# Patient Record
Sex: FEMALE | Race: WHITE | Employment: FULL TIME | URBAN - METROPOLITAN AREA
[De-identification: names, ages, dates, MRNs, and addresses within clinical notes are randomized per-mention and may not be internally consistent; named-entity substitution may affect disease eponyms.]

---

## 2018-04-06 ENCOUNTER — HOSPITAL ENCOUNTER (OUTPATIENT)
Dept: ULTRASOUND IMAGING | Age: 38
Discharge: HOME | End: 2018-04-06
Payer: COMMERCIAL

## 2018-04-06 DIAGNOSIS — Z34.91 CURRENTLY PREGNANT IN FIRST TRIMESTER WITH UNKNOWN GESTATIONAL AGE: ICD-10-CM

## 2018-04-06 PROCEDURE — 76817 TRANSVAGINAL US OBSTETRIC: CPT

## 2018-11-13 ENCOUNTER — HOSPITAL ENCOUNTER (EMERGENCY)
Age: 38
Discharge: HOME | End: 2018-11-13
Attending: EMERGENCY MEDICINE
Payer: COMMERCIAL

## 2018-11-13 VITALS
BODY MASS INDEX: 24.55 KG/M2 | SYSTOLIC BLOOD PRESSURE: 110 MMHG | HEART RATE: 100 BPM | TEMPERATURE: 98 F | WEIGHT: 130 LBS | HEIGHT: 61 IN | OXYGEN SATURATION: 98 % | RESPIRATION RATE: 14 BRPM | DIASTOLIC BLOOD PRESSURE: 74 MMHG

## 2018-11-13 DIAGNOSIS — J01.01 ACUTE RECURRENT MAXILLARY SINUSITIS: Primary | ICD-10-CM

## 2018-11-13 PROCEDURE — G0382 LEV 3 HOSP TYPE B ED VISIT: HCPCS

## 2018-11-13 PROCEDURE — 99283 EMERGENCY DEPT VISIT LOW MDM: CPT

## 2018-11-13 RX ORDER — AZITHROMYCIN 250 MG/1
TABLET, FILM COATED ORAL
Qty: 1 PACKET | Refills: 0 | Status: SHIPPED | OUTPATIENT
Start: 2018-11-13 | End: 2018-11-23

## 2018-11-13 NOTE — ED PROVIDER NOTES
HPI:  18, Time: 5:46 PM         Dayna Alaniz is a 41 y.o. female presenting to the ED for cough, beginning days ago. The complaint has been intermittent, mild in severity, and worsened by nothing. No chest pain no shortness of breath no fever no chills. She was recently placed on amoxicillin for sinusitis. She said a nonproductive cough as well as nasal congestion. No wheezing    ROS:   Pertinent positives and negatives are stated within HPI, all other systems reviewed and are negative.  --------------------------------------------- PAST HISTORY ---------------------------------------------  Past Medical History:  has a past medical history of Migraine. Past Surgical History:  has a past surgical history that includes  section. Social History:  reports that she has been smoking. She has been smoking about 0.50 packs per day. She does not have any smokeless tobacco history on file. She reports that she does not drink alcohol or use drugs. Family History: family history is not on file. The patients home medications have been reviewed. Allergies: Imitrex [sumatriptan]    ---------------------------------------------------PHYSICAL EXAM--------------------------------------     Constitutional/General: Alert and oriented x3, well appearing, non toxic in NAD  Head: Normocephalic and atraumatic  Eyes: PERRL, EOMI  Ears reveal bilateral serous effusion. No erythema tympanic membranes  Nares reveal nasal congestion  Mouth: Oropharynx clear, handling secretions, no trismus. No erythema no exudate  Neck: Supple, full ROM, non tender to palpation in the midline, no stridor, no crepitus, no meningeal signs  Pulmonary: Lungs clear to auscultation bilaterally, no wheezes, rales, or rhonchi. Not in respiratory distress  Cardiovascular:  Regular rate. Regular rhythm. No murmurs, gallops, or rubs. 2+ distal pulses  Chest: no chest wall tenderness  Abdomen: Soft. Non tender. Non distended. +BS.   No

## 2018-12-11 ENCOUNTER — HOSPITAL ENCOUNTER (EMERGENCY)
Age: 38
Discharge: HOME | End: 2018-12-11
Attending: EMERGENCY MEDICINE
Payer: COMMERCIAL

## 2018-12-11 VITALS
BODY MASS INDEX: 23.98 KG/M2 | HEART RATE: 82 BPM | HEIGHT: 61 IN | TEMPERATURE: 98.7 F | WEIGHT: 127 LBS | DIASTOLIC BLOOD PRESSURE: 68 MMHG | OXYGEN SATURATION: 98 % | RESPIRATION RATE: 16 BRPM | SYSTOLIC BLOOD PRESSURE: 100 MMHG

## 2018-12-11 DIAGNOSIS — J01.90 ACUTE SINUSITIS, RECURRENCE NOT SPECIFIED, UNSPECIFIED LOCATION: Primary | ICD-10-CM

## 2018-12-11 DIAGNOSIS — Z34.90 PREGNANCY, UNSPECIFIED GESTATIONAL AGE: ICD-10-CM

## 2018-12-11 LAB
BACTERIA: NORMAL /HPF
BILIRUBIN URINE: ABNORMAL
BLOOD, URINE: ABNORMAL
CLARITY: CLEAR
COLOR: YELLOW
GLUCOSE URINE: NEGATIVE MG/DL
HCG(URINE) PREGNANCY TEST: POSITIVE
KETONES, URINE: ABNORMAL MG/DL
LEUKOCYTE ESTERASE, URINE: NEGATIVE
NITRITE, URINE: NEGATIVE
PH UA: 6.5 (ref 5–9)
PROTEIN UA: NEGATIVE MG/DL
RBC UA: NORMAL /HPF (ref 0–2)
SPECIFIC GRAVITY UA: 1.02 (ref 1–1.03)
UROBILINOGEN, URINE: 1 E.U./DL
WBC UA: NORMAL /HPF (ref 0–5)

## 2018-12-11 PROCEDURE — 81025 URINE PREGNANCY TEST: CPT

## 2018-12-11 PROCEDURE — G0382 LEV 3 HOSP TYPE B ED VISIT: HCPCS

## 2018-12-11 PROCEDURE — 81001 URINALYSIS AUTO W/SCOPE: CPT

## 2018-12-11 PROCEDURE — 99283 EMERGENCY DEPT VISIT LOW MDM: CPT

## 2018-12-11 RX ORDER — AMOXICILLIN AND CLAVULANATE POTASSIUM 875; 125 MG/1; MG/1
1 TABLET, FILM COATED ORAL 2 TIMES DAILY
Qty: 20 TABLET | Refills: 0 | Status: SHIPPED | OUTPATIENT
Start: 2018-12-11 | End: 2018-12-21

## 2018-12-11 RX ORDER — LORATADINE 10 MG/1
10 TABLET ORAL DAILY
Qty: 14 TABLET | Refills: 0 | Status: SHIPPED | OUTPATIENT
Start: 2018-12-11 | End: 2021-05-19

## 2018-12-11 ASSESSMENT — ENCOUNTER SYMPTOMS
SHORTNESS OF BREATH: 0
CONSTIPATION: 0
COLOR CHANGE: 0
VOICE CHANGE: 0
TROUBLE SWALLOWING: 0
BLOOD IN STOOL: 0
RHINORRHEA: 1
DIARRHEA: 0
VOMITING: 0
NAUSEA: 0
SORE THROAT: 0
ABDOMINAL PAIN: 0
COUGH: 0
SINUS PRESSURE: 1
BACK PAIN: 0
SINUS PAIN: 1

## 2018-12-11 ASSESSMENT — PAIN DESCRIPTION - LOCATION: LOCATION: FACE;HEAD

## 2018-12-11 ASSESSMENT — PAIN DESCRIPTION - ONSET: ONSET: ON-GOING

## 2018-12-11 ASSESSMENT — PAIN DESCRIPTION - PROGRESSION: CLINICAL_PROGRESSION: NOT CHANGED

## 2018-12-11 ASSESSMENT — PAIN DESCRIPTION - PAIN TYPE: TYPE: ACUTE PAIN

## 2018-12-11 ASSESSMENT — PAIN DESCRIPTION - DESCRIPTORS: DESCRIPTORS: ACHING

## 2018-12-11 ASSESSMENT — PAIN SCALES - GENERAL: PAINLEVEL_OUTOF10: 7

## 2018-12-11 ASSESSMENT — PAIN DESCRIPTION - FREQUENCY: FREQUENCY: CONTINUOUS

## 2018-12-11 NOTE — ED PROVIDER NOTES
Patient is a 30-year-old female presenting with sinusitis she is also curious if she is pregnant or not. She has had URI symptoms for about a month now. She was seen here about 3 weeks ago and given a Z-Keith for them but that has not helped. Her symptoms have persisted. She's not tried anything else. She has nasal congestion, rhinorrhea, sneezing, and facial pressure. She denies any fevers or chills. She feels that she has postnasal drip that causes her to have a little cough. Nothing makes her symptoms worse. She denies any shortness of breath. She denies any seasonal allergies. She really she may be pregnant. She did a pregnancy to \[facility] [place] [facility]\"" thanks giving and it was positive. Her last menstrual period was in October. She says that she has had a few miscarriages in the past. She denies any abdominal pain. She has had intermittent scattered vaginal spotting at times. She denies any urinary symptoms. She denies any back pain. The history is provided by the patient. Review of Systems   Constitutional: Negative for chills and fever. HENT: Positive for congestion, postnasal drip, rhinorrhea, sinus pain and sinus pressure. Negative for ear pain, sore throat, trouble swallowing and voice change. Respiratory: Negative for cough and shortness of breath. Cardiovascular: Negative for chest pain and palpitations. Gastrointestinal: Negative for abdominal pain, blood in stool, constipation, diarrhea, nausea and vomiting. Genitourinary: Negative for difficulty urinating, dysuria and hematuria. Musculoskeletal: Negative for back pain and neck pain. Skin: Negative for color change, rash and wound. Neurological: Negative for dizziness, syncope, weakness, light-headedness and headaches. Psychiatric/Behavioral: Negative for confusion. Physical Exam   Constitutional: She is oriented to person, place, and time. She appears well-developed and well-nourished. No distress.    Looks older than stated age   HENT:   Head: Normocephalic and atraumatic. Right Ear: External ear normal.   Left Ear: External ear normal.   Nose: Nose normal.   Mouth/Throat: Oropharynx is clear and moist. No oropharyngeal exudate. Frontal and maxillary sinus tenderness to palpation; his mucosa erythematous   Eyes: Pupils are equal, round, and reactive to light. Conjunctivae and EOM are normal. Right eye exhibits no discharge. Left eye exhibits no discharge. No scleral icterus. Neck: Neck supple. Cardiovascular: Normal rate, regular rhythm, normal heart sounds and intact distal pulses. Exam reveals no gallop and no friction rub. No murmur heard. Pulmonary/Chest: Effort normal and breath sounds normal. No stridor. No respiratory distress. She has no wheezes. She has no rales. She exhibits no tenderness. Abdominal: Soft. Bowel sounds are normal. She exhibits no distension and no mass. There is no tenderness. There is no rebound and no guarding. Musculoskeletal: She exhibits no edema. Neurological: She is alert and oriented to person, place, and time. She exhibits normal muscle tone. Skin: Skin is warm and dry. No rash noted. She is not diaphoretic. No erythema. No pallor. Psychiatric: She has a normal mood and affect. Her behavior is normal. Judgment and thought content normal.       Procedures    MDM  Number of Diagnoses or Management Options  Acute sinusitis, recurrence not specified, unspecified location:   Pregnancy, unspecified gestational age:   Diagnosis management comments: Patient has had URI symptoms for the month. She was treated with Z-Keith but did not get better. Patient will be given Augmentin and Claritin. Her urine prior to CTA chest was positive. Her urinalysis was negative for infection. She is not having any abdominal pain and bleeding today. She was encouraged to take daily prenatal vitamins and avoid taking any ibuprofen, Motrin, or aspirin and only to take Tylenol as needed for pain.  She was given

## 2018-12-21 ENCOUNTER — HOSPITAL ENCOUNTER (OUTPATIENT)
Age: 38
Discharge: HOME | End: 2018-12-21
Payer: COMMERCIAL

## 2018-12-21 LAB
ABO/RH: NORMAL
ANTIBODY SCREEN: NORMAL
BASOPHILS ABSOLUTE: 0.06 E9/L (ref 0–0.2)
BASOPHILS RELATIVE PERCENT: 0.9 % (ref 0–2)
EOSINOPHILS ABSOLUTE: 0.12 E9/L (ref 0.05–0.5)
EOSINOPHILS RELATIVE PERCENT: 1.8 % (ref 0–6)
GONADOTROPIN, CHORIONIC (HCG) QUANT: 33.1 MIU/ML
HCT VFR BLD CALC: 40.5 % (ref 34–48)
HEMOGLOBIN: 13.7 G/DL (ref 11.5–15.5)
IMMATURE GRANULOCYTES #: 0.02 E9/L
IMMATURE GRANULOCYTES %: 0.3 % (ref 0–5)
LYMPHOCYTES ABSOLUTE: 1.99 E9/L (ref 1.5–4)
LYMPHOCYTES RELATIVE PERCENT: 30.1 % (ref 20–42)
MCH RBC QN AUTO: 31.6 PG (ref 26–35)
MCHC RBC AUTO-ENTMCNC: 33.8 % (ref 32–34.5)
MCV RBC AUTO: 93.5 FL (ref 80–99.9)
MONOCYTES ABSOLUTE: 0.29 E9/L (ref 0.1–0.95)
MONOCYTES RELATIVE PERCENT: 4.4 % (ref 2–12)
NEUTROPHILS ABSOLUTE: 4.14 E9/L (ref 1.8–7.3)
NEUTROPHILS RELATIVE PERCENT: 62.5 % (ref 43–80)
PDW BLD-RTO: 13.3 FL (ref 11.5–15)
PLATELET # BLD: 341 E9/L (ref 130–450)
PMV BLD AUTO: 8.9 FL (ref 7–12)
RBC # BLD: 4.33 E12/L (ref 3.5–5.5)
WBC # BLD: 6.6 E9/L (ref 4.5–11.5)

## 2018-12-21 PROCEDURE — 86703 HIV-1/HIV-2 1 RESULT ANTBDY: CPT

## 2018-12-21 PROCEDURE — 87340 HEPATITIS B SURFACE AG IA: CPT

## 2018-12-21 PROCEDURE — 84702 CHORIONIC GONADOTROPIN TEST: CPT

## 2018-12-21 PROCEDURE — 36415 COLL VENOUS BLD VENIPUNCTURE: CPT

## 2018-12-21 PROCEDURE — 86900 BLOOD TYPING SEROLOGIC ABO: CPT

## 2018-12-21 PROCEDURE — 85025 COMPLETE CBC W/AUTO DIFF WBC: CPT

## 2018-12-21 PROCEDURE — 86850 RBC ANTIBODY SCREEN: CPT

## 2018-12-21 PROCEDURE — 86592 SYPHILIS TEST NON-TREP QUAL: CPT

## 2018-12-21 PROCEDURE — 86901 BLOOD TYPING SEROLOGIC RH(D): CPT

## 2018-12-21 PROCEDURE — 86762 RUBELLA ANTIBODY: CPT

## 2018-12-21 PROCEDURE — 86803 HEPATITIS C AB TEST: CPT

## 2018-12-26 LAB
HEPATITIS B SURFACE ANTIGEN INTERPRETATION: NORMAL
HEPATITIS C ANTIBODY INTERPRETATION: REACTIVE
HIV-1 AND HIV-2 ANTIBODIES: NORMAL
RPR: NORMAL

## 2018-12-27 LAB — RUBELLA ANTIBODY IGG: NORMAL

## 2020-02-11 ENCOUNTER — HOSPITAL ENCOUNTER (OUTPATIENT)
Age: 40
Discharge: HOME | End: 2020-02-11
Payer: COMMERCIAL

## 2020-02-11 LAB
ALBUMIN SERPL-MCNC: 4.7 G/DL (ref 3.5–5.2)
ALP BLD-CCNC: 66 U/L (ref 35–104)
ALT SERPL-CCNC: 46 U/L (ref 0–32)
ANION GAP SERPL CALCULATED.3IONS-SCNC: 12 MMOL/L (ref 7–16)
AST SERPL-CCNC: 31 U/L (ref 0–31)
BASOPHILS ABSOLUTE: 0.07 E9/L (ref 0–0.2)
BASOPHILS RELATIVE PERCENT: 1.1 % (ref 0–2)
BILIRUB SERPL-MCNC: 0.5 MG/DL (ref 0–1.2)
BUN BLDV-MCNC: 13 MG/DL (ref 6–20)
CALCIUM SERPL-MCNC: 9.8 MG/DL (ref 8.6–10.2)
CHLORIDE BLD-SCNC: 101 MMOL/L (ref 98–107)
CO2: 25 MMOL/L (ref 22–29)
CREAT SERPL-MCNC: 0.5 MG/DL (ref 0.5–1)
EOSINOPHILS ABSOLUTE: 0.15 E9/L (ref 0.05–0.5)
EOSINOPHILS RELATIVE PERCENT: 2.3 % (ref 0–6)
GFR AFRICAN AMERICAN: >60
GFR NON-AFRICAN AMERICAN: >60 ML/MIN/1.73
GLUCOSE BLD-MCNC: 74 MG/DL (ref 74–99)
HCT VFR BLD CALC: 40.3 % (ref 34–48)
HEMOGLOBIN: 13.7 G/DL (ref 11.5–15.5)
IMMATURE GRANULOCYTES #: 0.01 E9/L
IMMATURE GRANULOCYTES %: 0.2 % (ref 0–5)
LYMPHOCYTES ABSOLUTE: 2.63 E9/L (ref 1.5–4)
LYMPHOCYTES RELATIVE PERCENT: 41 % (ref 20–42)
MCH RBC QN AUTO: 32 PG (ref 26–35)
MCHC RBC AUTO-ENTMCNC: 34 % (ref 32–34.5)
MCV RBC AUTO: 94.2 FL (ref 80–99.9)
MONOCYTES ABSOLUTE: 0.43 E9/L (ref 0.1–0.95)
MONOCYTES RELATIVE PERCENT: 6.7 % (ref 2–12)
NEUTROPHILS ABSOLUTE: 3.13 E9/L (ref 1.8–7.3)
NEUTROPHILS RELATIVE PERCENT: 48.7 % (ref 43–80)
PDW BLD-RTO: 12.4 FL (ref 11.5–15)
PLATELET # BLD: 237 E9/L (ref 130–450)
PMV BLD AUTO: 9.4 FL (ref 7–12)
POTASSIUM SERPL-SCNC: 4.6 MMOL/L (ref 3.5–5)
RBC # BLD: 4.28 E12/L (ref 3.5–5.5)
SODIUM BLD-SCNC: 138 MMOL/L (ref 132–146)
TOTAL PROTEIN: 8.7 G/DL (ref 6.4–8.3)
TSH SERPL DL<=0.05 MIU/L-ACNC: 2.04 UIU/ML (ref 0.27–4.2)
WBC # BLD: 6.4 E9/L (ref 4.5–11.5)

## 2020-02-11 PROCEDURE — 84443 ASSAY THYROID STIM HORMONE: CPT

## 2020-02-11 PROCEDURE — 85025 COMPLETE CBC W/AUTO DIFF WBC: CPT

## 2020-02-11 PROCEDURE — 36415 COLL VENOUS BLD VENIPUNCTURE: CPT

## 2020-02-11 PROCEDURE — 80053 COMPREHEN METABOLIC PANEL: CPT

## 2020-06-26 ENCOUNTER — OFFICE VISIT (OUTPATIENT)
Dept: NEUROLOGY | Age: 40
End: 2020-06-26
Payer: COMMERCIAL

## 2020-06-26 VITALS
DIASTOLIC BLOOD PRESSURE: 70 MMHG | HEIGHT: 61 IN | BODY MASS INDEX: 21.14 KG/M2 | WEIGHT: 112 LBS | SYSTOLIC BLOOD PRESSURE: 100 MMHG | TEMPERATURE: 98.3 F

## 2020-06-26 PROBLEM — G43.109 MIGRAINE WITH AURA AND WITHOUT STATUS MIGRAINOSUS, NOT INTRACTABLE: Chronic | Status: ACTIVE | Noted: 2020-06-26

## 2020-06-26 PROBLEM — Z86.69 HISTORY OF MIGRAINE: Status: ACTIVE | Noted: 2020-06-26

## 2020-06-26 PROBLEM — F17.200 TOBACCO USE DISORDER: Chronic | Status: ACTIVE | Noted: 2020-06-26

## 2020-06-26 PROBLEM — R90.89 ABNORMAL BRAIN MRI: Status: ACTIVE | Noted: 2020-06-26

## 2020-06-26 PROCEDURE — G8427 DOCREV CUR MEDS BY ELIG CLIN: HCPCS | Performed by: PSYCHIATRY & NEUROLOGY

## 2020-06-26 PROCEDURE — 1036F TOBACCO NON-USER: CPT | Performed by: PSYCHIATRY & NEUROLOGY

## 2020-06-26 PROCEDURE — G8420 CALC BMI NORM PARAMETERS: HCPCS | Performed by: PSYCHIATRY & NEUROLOGY

## 2020-06-26 PROCEDURE — 99204 OFFICE O/P NEW MOD 45 MIN: CPT | Performed by: PSYCHIATRY & NEUROLOGY

## 2020-06-26 RX ORDER — AMITRIPTYLINE HYDROCHLORIDE 10 MG/1
TABLET, FILM COATED ORAL
Qty: 60 TABLET | Refills: 2 | Status: SHIPPED
Start: 2020-06-26 | End: 2021-05-19

## 2020-06-26 RX ORDER — FLUTICASONE PROPIONATE 50 MCG
SPRAY, SUSPENSION (ML) NASAL
COMMUNITY
Start: 2020-06-25 | End: 2021-05-19

## 2020-06-26 ASSESSMENT — ENCOUNTER SYMPTOMS
ALLERGIC/IMMUNOLOGIC NEGATIVE: 1
GASTROINTESTINAL NEGATIVE: 1
EYES NEGATIVE: 1
RESPIRATORY NEGATIVE: 1

## 2020-06-26 NOTE — PROGRESS NOTES
Standing Status:   Future     Standing Expiration Date:   6/26/2021    RPR Reflex to Titer and TPPA     Standing Status:   Future     Standing Expiration Date:   6/26/2021    Ammonia     Standing Status:   Future     Standing Expiration Date:   6/26/2021    Lipid Panel     Standing Status:   Future     Standing Expiration Date:   6/26/2021     Order Specific Question:   Is Patient Fasting?/# of Hours     Answer:   yes     Comments:   10

## 2020-07-07 ENCOUNTER — TELEPHONE (OUTPATIENT)
Dept: NEUROLOGY | Age: 40
End: 2020-07-07

## 2020-07-07 NOTE — TELEPHONE ENCOUNTER
[facility], prior authorization is required for Brain CTA at [facility]. Aleksandar Lizama approved 7/1/20-12/28/20, auth # [id number].

## 2021-01-25 ENCOUNTER — HOSPITAL ENCOUNTER (OUTPATIENT)
Age: 41
Discharge: HOME | End: 2021-01-27
Payer: COMMERCIAL

## 2021-01-25 ENCOUNTER — HOSPITAL ENCOUNTER (OUTPATIENT)
Dept: GENERAL RADIOLOGY | Age: 41
Discharge: HOME | End: 2021-01-27
Payer: COMMERCIAL

## 2021-01-25 DIAGNOSIS — M54.50 LUMBAR SPINE PAIN: ICD-10-CM

## 2021-01-25 DIAGNOSIS — M54.6 THORACIC SPINE PAIN: ICD-10-CM

## 2021-01-25 PROCEDURE — 72072 X-RAY EXAM THORAC SPINE 3VWS: CPT

## 2021-01-25 PROCEDURE — 72220 X-RAY EXAM SACRUM TAILBONE: CPT

## 2021-01-25 PROCEDURE — 72100 X-RAY EXAM L-S SPINE 2/3 VWS: CPT

## 2021-05-13 ENCOUNTER — HOSPITAL ENCOUNTER (OUTPATIENT)
Age: 41
Discharge: HOME | End: 2021-05-13
Payer: COMMERCIAL

## 2021-05-13 LAB
ALBUMIN SERPL-MCNC: 4.6 G/DL (ref 3.5–5.2)
ALP BLD-CCNC: 66 U/L (ref 35–104)
ALT SERPL-CCNC: 42 U/L (ref 0–32)
ANION GAP SERPL CALCULATED.3IONS-SCNC: 9 MMOL/L (ref 7–16)
AST SERPL-CCNC: 34 U/L (ref 0–31)
BASOPHILS ABSOLUTE: 0.04 E9/L (ref 0–0.2)
BASOPHILS RELATIVE PERCENT: 0.6 % (ref 0–2)
BILIRUB SERPL-MCNC: 0.7 MG/DL (ref 0–1.2)
BUN BLDV-MCNC: 8 MG/DL (ref 6–20)
CALCIUM SERPL-MCNC: 9.5 MG/DL (ref 8.6–10.2)
CHLORIDE BLD-SCNC: 101 MMOL/L (ref 98–107)
CHOLESTEROL, TOTAL: 107 MG/DL (ref 0–199)
CO2: 27 MMOL/L (ref 22–29)
CREAT SERPL-MCNC: 0.6 MG/DL (ref 0.5–1)
EOSINOPHILS ABSOLUTE: 0.08 E9/L (ref 0.05–0.5)
EOSINOPHILS RELATIVE PERCENT: 1.3 % (ref 0–6)
GFR AFRICAN AMERICAN: >60
GFR NON-AFRICAN AMERICAN: >60 ML/MIN/1.73
GLUCOSE BLD-MCNC: 79 MG/DL (ref 74–99)
HBA1C MFR BLD: 4.9 % (ref 4–5.6)
HCT VFR BLD CALC: 40.4 % (ref 34–48)
HDLC SERPL-MCNC: 37 MG/DL
HEMOGLOBIN: 14.2 G/DL (ref 11.5–15.5)
IMMATURE GRANULOCYTES #: 0.02 E9/L
IMMATURE GRANULOCYTES %: 0.3 % (ref 0–5)
LDL CHOLESTEROL CALCULATED: 57 MG/DL (ref 0–99)
LYMPHOCYTES ABSOLUTE: 1.8 E9/L (ref 1.5–4)
LYMPHOCYTES RELATIVE PERCENT: 28.7 % (ref 20–42)
MCH RBC QN AUTO: 32.3 PG (ref 26–35)
MCHC RBC AUTO-ENTMCNC: 35.1 % (ref 32–34.5)
MCV RBC AUTO: 91.8 FL (ref 80–99.9)
MONOCYTES ABSOLUTE: 0.37 E9/L (ref 0.1–0.95)
MONOCYTES RELATIVE PERCENT: 5.9 % (ref 2–12)
NEUTROPHILS ABSOLUTE: 3.97 E9/L (ref 1.8–7.3)
NEUTROPHILS RELATIVE PERCENT: 63.2 % (ref 43–80)
PDW BLD-RTO: 12.2 FL (ref 11.5–15)
PLATELET # BLD: 247 E9/L (ref 130–450)
PMV BLD AUTO: 9.2 FL (ref 7–12)
POTASSIUM SERPL-SCNC: 4.1 MMOL/L (ref 3.5–5)
RBC # BLD: 4.4 E12/L (ref 3.5–5.5)
SODIUM BLD-SCNC: 137 MMOL/L (ref 132–146)
T4 FREE: 1.26 NG/DL (ref 0.93–1.7)
TOTAL PROTEIN: 8.1 G/DL (ref 6.4–8.3)
TRIGL SERPL-MCNC: 64 MG/DL (ref 0–149)
TSH SERPL DL<=0.05 MIU/L-ACNC: 2.26 UIU/ML (ref 0.27–4.2)
VITAMIN D 25-HYDROXY: 13 NG/ML (ref 30–100)
VLDLC SERPL CALC-MCNC: 13 MG/DL
WBC # BLD: 6.3 E9/L (ref 4.5–11.5)

## 2021-05-13 PROCEDURE — 82306 VITAMIN D 25 HYDROXY: CPT

## 2021-05-13 PROCEDURE — 80061 LIPID PANEL: CPT

## 2021-05-13 PROCEDURE — 87389 HIV-1 AG W/HIV-1&-2 AB AG IA: CPT

## 2021-05-13 PROCEDURE — 85025 COMPLETE CBC W/AUTO DIFF WBC: CPT

## 2021-05-13 PROCEDURE — 83036 HEMOGLOBIN GLYCOSYLATED A1C: CPT

## 2021-05-13 PROCEDURE — 80053 COMPREHEN METABOLIC PANEL: CPT

## 2021-05-13 PROCEDURE — 84443 ASSAY THYROID STIM HORMONE: CPT

## 2021-05-13 PROCEDURE — 36415 COLL VENOUS BLD VENIPUNCTURE: CPT

## 2021-05-13 PROCEDURE — 84439 ASSAY OF FREE THYROXINE: CPT

## 2021-05-16 LAB
Lab: NORMAL
REPORT: NORMAL
THIS TEST SENT TO: NORMAL

## 2021-05-19 ENCOUNTER — HOSPITAL ENCOUNTER (OUTPATIENT)
Age: 41
Discharge: HOME | End: 2021-05-21
Payer: COMMERCIAL

## 2021-05-19 ENCOUNTER — OFFICE VISIT (OUTPATIENT)
Dept: SURGERY | Age: 41
End: 2021-05-19
Payer: COMMERCIAL

## 2021-05-19 VITALS
WEIGHT: 112 LBS | HEART RATE: 65 BPM | DIASTOLIC BLOOD PRESSURE: 48 MMHG | SYSTOLIC BLOOD PRESSURE: 85 MMHG | BODY MASS INDEX: 21.14 KG/M2 | TEMPERATURE: 98.3 F | HEIGHT: 61 IN

## 2021-05-19 DIAGNOSIS — D21.0: Primary | ICD-10-CM

## 2021-05-19 DIAGNOSIS — L72.3 SEBACEOUS CYST: Primary | ICD-10-CM

## 2021-05-19 PROCEDURE — 21555 EXC NECK LES SC < 3 CM: CPT | Performed by: SURGERY

## 2021-05-19 PROCEDURE — 99203 OFFICE O/P NEW LOW 30 MIN: CPT | Performed by: SURGERY

## 2021-05-19 NOTE — PROGRESS NOTES
General Surgery History and Physical  [facility]    Patient's Name/Date of Birth: Vicky Vázquez /     Date: May 19, 2021     Surgeon: Nida Garza MD    PCP: RAYNA Raman CNP     Chief Complaint: soft tissue neoplasm of the left neck    HPI:   Vicky Vázquez is a 46 y.o. female who presents for evaluation of soft tissue neoplasm of the left neck with pain and swelling. Timing is constant, radiation to left neck, alleviated by nothing and started several weeks ago and severity is 8/10. Has drainage, some pain. Denies similar in the past. No fever, chills. Denies previous at the same site. Would like to have removed. Has been treated with antibiotics but the swelling has returned. She has not had it drained previously. Patient Active Problem List   Diagnosis    Abnormal brain MRI    History of migraine    Migraine with aura and without status migrainosus, not intractable    Tobacco use disorder       Past Medical History:   Diagnosis Date    Abnormal brain MRI 2020    History of migraine 2020    Migraine     Migraine with aura and without status migrainosus, not intractable 2020    [facility] x 2 mos. , hx migraines    Tobacco use disorder 2020       Past Surgical History:   Procedure Laterality Date     SECTION      x3       Allergies   Allergen Reactions    Imitrex [Sumatriptan] Shortness Of Breath     States had to be intubated with this medication    Naltrexone Other (See Comments)     \"Closes throat\"       The patient has a family history that is negative for severe cardiovascular or respiratory issues, negative for reaction to anesthesia. Time spent reviewing past medical, surgical, social and family history, vitals, nursing assessment and images. No changes from above documented history.     Social History     Socioeconomic History    Marital status:      Spouse name: Not on file    Number of children: Not on file  Years of education: Not on file    Highest education level: Not on file   Occupational History    Not on file   Tobacco Use    Smoking status: Former Smoker     Packs/day: 0.50    Smokeless tobacco: Never Used   Vaping Use    Vaping Use: Every day    Substances: Always   Substance and Sexual Activity    Alcohol use: No    Drug use: No     Comment: heroin in past    Sexual activity: Yes   Other Topics Concern    Not on file   Social History Narrative    Not on file     Social Determinants of Health     Financial Resource Strain:     Difficulty of Paying Living Expenses:    Food Insecurity:     Worried About Running Out of Food in the Last Year:     [address] in the Last Year:    Transportation Needs:     Lack of Transportation (Medical):  Lack of Transportation (Non-Medical):    Physical Activity:     Days of Exercise per Week:     Minutes of Exercise per Session:    Stress:     Feeling of Stress :    Social Connections:     Frequency of Communication with Friends and Family:     Frequency of Social Gatherings with Friends and Family:     Attends [employer] Services:     Active Member of Clubs or Organizations:     Attends Club or Organization Meetings:     Marital Status:    Intimate Partner Violence:     Fear of Current or Ex-Partner:     Emotionally Abused:     Physically Abused:     Sexually Abused:            Review of Systems  A complete 10 system review was performed and are otherwise negative unless mentioned in the above HPI. Specific negatives are listed below but may not include all those reviewed.     General ROS: negative obtundation, AMS  ENT ROS: negative rhinorrhea, epistaxis  Allergy and Immunology ROS: negative itchy/watery eyes or nasal congestion  Hematological and Lymphatic ROS: negative spontaneous bleeding or bruising  Endocrine ROS: negative  lethargy, mood swings, palpitations or polydipsia/polyuria  Respiratory ROS: negative sputum changes, stridor, tachypnea or wheezing  Cardiovascular ROS: negative for - loss of consciousness, murmur or orthopnea  Gastrointestinal ROS: negative for - hematochezia or hematemesis  Genito-Urinary ROS: negative for -  genital discharge or hematuria  Musculoskeletal ROS: negative for - focal weakness, gangrene  Psych/Neuro ROS: negative for - visual or auditory hallucinations, suicidal ideation    Physical exam:   BP (!) 85/48   Pulse 65   Temp 98.3 °F (36.8 °C) (Temporal)   Ht 5' 1\" (1.549 m)   Wt 112 lb (50.8 kg)   BMI 21.16 kg/m²   General appearance:  NAD, appears stated age  Head: NCAT, PERRLA, EOMI, red conjunctiva  Neck: supple, no masses, trachea midline  Lungs: Equal chest rise bilateral, no retractions, no wheezing  Heart: Reg rate  Abdomen: soft, nontender, nondistended  Skin; soft tissue mass 2 cm cm located left lateral neck, mobile, tender, no drainage  Gu: no cva tenderness  Extremities: atraumatic, no focal motor deficits, no open wounds  Psych: No tremor, visual hallucinations      Radiology: N/A    Assessment:  Addie Domínguez is a 46 y.o. female with soft tissue neoplasm of the left neck, pain at the site with swelling  Patient Active Problem List   Diagnosis    Abnormal brain MRI    History of migraine    Migraine with aura and without status migrainosus, not intractable    Tobacco use disorder         Plan:  Excision soft tissue neoplasm of left neck  Discussed the risk, benefits and alternatives of surgery including wound infections, bleeding, scar, seroma, hematoma and recurrence of the mass or similar in other locations and the risks of general anesthetic including MI, CVA, sudden death or reactions to anesthetic medications. The patient understands the risks and alternatives and the possibility of converting to an open procedure. All questions were answered to the patient's satisfaction and they freely signed the consent.        DATE OF PROCEDURE: 5/19/2021    Operative Note  [facility]     Reid Bui Click  <[id number]>    SURGEON: Angie Mckenzie MD    ASSISTANT: none    PREOPERATIVE DIAGNOSIS: Soft tissue neoplasm of left neck with recurrent infections  and pain. POSTOPERATIVE DIAGNOSIS: same. OPERATION: Excision of soft tissue neoplasm of left neck. ANESTHESIA: LMAC and local.     ESTIMATED BLOOD LOSS: Minimal.     COMPLICATIONS: None. FLUIDS: Crystalloid. DISPOSITION: home. SPECIMEN: Soft tissue neoplasm left neck. PROCEDURE: Within the exam room, the patient was laid in the semirecumbent position with her head turned to the right. The lesion in left neck were prepped and draped in a sterile fashion. 1% lidocaine with epinephrine was then injected around the lesion. Once this was done, approximately a 3 cm elliptical incision was made around the lesion with a 15 blade scalpel. Dissection was carried down through the subcutaneous tissues down to the base of the lesion which was removed in its entirety. Bleeding at the base of the lesion was controlled with silver nitrate sticks. Once hemostasis was achieved. The wound was irrigated and it was closed with 3-0 Vicryl dermal sutures. Finally, surgical glue was placed. The patient tolerated the procedure well.       Angie Mckeznie MD  5:10 PM  5/19/2021

## 2021-05-22 ENCOUNTER — HOSPITAL ENCOUNTER (EMERGENCY)
Age: 41
Discharge: HOME | End: 2021-05-22
Attending: EMERGENCY MEDICINE
Payer: COMMERCIAL

## 2021-05-22 VITALS
RESPIRATION RATE: 16 BRPM | HEART RATE: 72 BPM | SYSTOLIC BLOOD PRESSURE: 94 MMHG | HEIGHT: 61 IN | WEIGHT: 112 LBS | BODY MASS INDEX: 21.14 KG/M2 | OXYGEN SATURATION: 99 % | TEMPERATURE: 98 F | DIASTOLIC BLOOD PRESSURE: 66 MMHG

## 2021-05-22 DIAGNOSIS — Z51.89 ENCOUNTER FOR WOUND RE-CHECK: Primary | ICD-10-CM

## 2021-05-22 PROCEDURE — 99283 EMERGENCY DEPT VISIT LOW MDM: CPT

## 2021-05-22 ASSESSMENT — ENCOUNTER SYMPTOMS
VOMITING: 0
SHORTNESS OF BREATH: 0
DIARRHEA: 0
ABDOMINAL DISTENTION: 0
SINUS PRESSURE: 0
SORE THROAT: 0
COUGH: 0
EYE PAIN: 0
EYE DISCHARGE: 0
BACK PAIN: 0
EYE REDNESS: 0
WHEEZING: 0
NAUSEA: 0

## 2021-05-22 ASSESSMENT — PAIN DESCRIPTION - LOCATION: LOCATION: NECK

## 2021-05-22 ASSESSMENT — PAIN DESCRIPTION - PROGRESSION
CLINICAL_PROGRESSION: NOT CHANGED
CLINICAL_PROGRESSION: NOT CHANGED

## 2021-05-22 ASSESSMENT — PAIN DESCRIPTION - PAIN TYPE: TYPE: ACUTE PAIN

## 2021-05-22 NOTE — ED PROVIDER NOTES
Sebaceous cyst excised 72 hours prior to today; Concerned about wound site; looks funny, non-tender, wants an opinion      Wound Check   She was treated in the ED 2 to 3 days ago. Previous treatment in the ED includes I&D of abscess. There has been no treatment since the wound repair. Her temperature was unmeasured prior to arrival. There has been no drainage from the wound. There is no redness present. There is no swelling present. There is no pain present. She has no difficulty moving the affected extremity or digit. Review of Systems   Constitutional: Negative for chills and fever. HENT: Negative for ear pain, sinus pressure and sore throat. Eyes: Negative for pain, discharge and redness. Respiratory: Negative for cough, shortness of breath and wheezing. Cardiovascular: Negative for chest pain. Gastrointestinal: Negative for abdominal distention, diarrhea, nausea and vomiting. Genitourinary: Negative for dysuria and frequency. Musculoskeletal: Negative for arthralgias and back pain. Skin: Positive for wound. Negative for rash. Neurological: Negative for weakness and headaches. Hematological: Negative for adenopathy. Psychiatric/Behavioral: Negative. All other systems reviewed and are negative. Physical Exam  Vitals and nursing note reviewed. Constitutional:       Appearance: She is well-developed. HENT:      Head: Normocephalic and atraumatic. Eyes:      Pupils: Pupils are equal, round, and reactive to light. Cardiovascular:      Rate and Rhythm: Normal rate and regular rhythm. Heart sounds: Normal heart sounds. No murmur heard. Pulmonary:      Effort: Pulmonary effort is normal.      Breath sounds: Normal breath sounds. Abdominal:      General: Bowel sounds are normal.      Palpations: Abdomen is soft. Tenderness: There is no abdominal tenderness. There is no guarding or rebound.    Musculoskeletal:      Cervical back: Normal range of motion and neck supple. Skin:     General: Skin is warm and dry. Neurological:      Mental Status: She is alert and oriented to person, place, and time. Psychiatric:         Behavior: Behavior normal.         Thought Content: Thought content normal.         Judgment: Judgment normal.        --------------------------------------------- PAST HISTORY ---------------------------------------------  Past Medical History:  has a past medical history of Abnormal brain MRI, History of migraine, Migraine, Migraine with aura and without status migrainosus, not intractable, Substance abuse ([facility] [address] and Tobacco use disorder. Past Surgical History:  has a past surgical history that includes  section and cyst removal.    Social History:  reports that she has quit smoking. She smoked 0.50 packs per day. She has never used smokeless tobacco. She reports previous alcohol use. She reports previous drug use. Family History: family history is not on file. The patients home medications have been reviewed. Allergies: Imitrex [sumatriptan] and Naltrexone    -------------------------------------------------- RESULTS -------------------------------------------------  No results found for this visit on 21. No orders to display       ------------------------- NURSING NOTES AND VITALS REVIEWED ---------------------------   The nursing notes within the ED encounter and vital signs as below have been reviewed. BP 94/66   Pulse 72   Temp 98 °F (36.7 °C) (Temporal)   Resp 16   Ht 5' 1\" (1.549 m)   Wt 112 lb (50.8 kg)   LMP 2021   SpO2 99%   BMI 21.16 kg/m²   Oxygen Saturation Interpretation: Normal      ------------------------------------------ PROGRESS NOTES ------------------------------------------   I have spoken with the patient and discussed todays results, in addition to providing specific details for the plan of care and counseling regarding the diagnosis and prognosis.   Their questions are

## 2021-05-24 ENCOUNTER — HOSPITAL ENCOUNTER (EMERGENCY)
Age: 41
Discharge: HOME | End: 2021-05-24
Attending: EMERGENCY MEDICINE
Payer: COMMERCIAL

## 2021-05-24 VITALS
HEART RATE: 70 BPM | WEIGHT: 112 LBS | TEMPERATURE: 97.7 F | RESPIRATION RATE: 16 BRPM | OXYGEN SATURATION: 99 % | DIASTOLIC BLOOD PRESSURE: 60 MMHG | BODY MASS INDEX: 21.16 KG/M2 | SYSTOLIC BLOOD PRESSURE: 99 MMHG

## 2021-05-24 DIAGNOSIS — T81.49XA INFECTED SURGICAL WOUND: Primary | ICD-10-CM

## 2021-05-24 PROCEDURE — 99282 EMERGENCY DEPT VISIT SF MDM: CPT

## 2021-05-24 PROCEDURE — 87186 SC STD MICRODIL/AGAR DIL: CPT

## 2021-05-24 PROCEDURE — 87077 CULTURE AEROBIC IDENTIFY: CPT

## 2021-05-24 PROCEDURE — 87070 CULTURE OTHR SPECIMN AEROBIC: CPT

## 2021-05-24 RX ORDER — CLINDAMYCIN HYDROCHLORIDE 150 MG/1
150 CAPSULE ORAL 4 TIMES DAILY
Qty: 40 CAPSULE | Refills: 0 | Status: SHIPPED | OUTPATIENT
Start: 2021-05-24 | End: 2021-06-02 | Stop reason: ALTCHOICE

## 2021-05-24 NOTE — ED PROVIDER NOTES
HPI:  21,   Time: 7:51 PM EDT         Melba Artis is a 46 y.o. female presenting to the ED for discharge and drainage from incisional site of sebaceous cyst removal on neck, beginning 2 days ago. Patient had sebaceous cyst removal performed on May 19, 2021 by Dr. Kari Moreno. ROS:   Pertinent positives and negatives are stated within HPI, all other systems reviewed and are negative.  --------------------------------------------- PAST HISTORY ---------------------------------------------  Past Medical History:  has a past medical history of Abnormal brain MRI, History of migraine, Migraine, Migraine with aura and without status migrainosus, not intractable, Substance abuse ([facility] [address] and Tobacco use disorder. Past Surgical History:  has a past surgical history that includes  section and cyst removal.    Social History:  reports that she has quit smoking. She smoked 0.50 packs per day. She has never used smokeless tobacco. She reports previous alcohol use. She reports previous drug use. Family History: family history is not on file. The patients home medications have been reviewed. Allergies: Imitrex [sumatriptan] and Naltrexone    -------------------------------------------------- RESULTS -------------------------------------------------  All laboratory and radiology results have been personally reviewed by myself   LABS:  No results found for this visit on 21. RADIOLOGY:  Interpreted by Radiologist.  No orders to display       ------------------------- NURSING NOTES AND VITALS REVIEWED ---------------------------   The nursing notes within the ED encounter and vital signs as below have been reviewed.    BP 99/60   Pulse 70   Temp 97.7 °F (36.5 °C) (Temporal)   Resp 16   Wt 112 lb (50.8 kg)   LMP 2021   SpO2 99%   BMI 21.16 kg/m²   Oxygen Saturation Interpretation: Normal      ---------------------------------------------------PHYSICAL EXAM--------------------------------------      Constitutional/General: Alert and oriented x3, well appearing, non toxic in NAD  Head: NC/AT  Eyes: PERRL, EOMI  Mouth: Oropharynx clear, handling secretions, no trismus  Neck: Supple, full ROM, no meningeal signs  Pulmonary: Lungs clear to auscultation bilaterally, no wheezes, rales, or rhonchi. Not in respiratory distress  Cardiovascular:  Regular rate and rhythm, no murmurs, gallops, or rubs. 2+ distal pulses  Abdomen: Soft, non tender, non distended,   Extremities: Moves all extremities x 4. Warm and well perfused  Skin: Incisional wound seen on the left side of neck with purulent drainage noted  Neurologic: GCS 15,  Psych: Normal Affect      ------------------------------ ED COURSE/MEDICAL DECISION MAKING----------------------  Medications - No data to display      Medical Decision Making:    Wound culture was done. Follow-up with Dr. Radha Johansen. Will Start patient on clindamycin at this time. Patient's Medications   New Prescriptions    CLINDAMYCIN (CLEOCIN) 150 MG CAPSULE    Take 1 capsule by mouth 4 times daily for 10 days   Previous Medications    BUSPIRONE HCL (BUSPAR PO)    Take 20 mg by mouth 2 times daily   Modified Medications    No medications on file   Discontinued Medications    No medications on file         Counseling: The emergency provider has spoken with the patient and discussed todays results, in addition to providing specific details for the plan of care and counseling regarding the diagnosis and prognosis. Questions are answered at this time and they are agreeable with the plan.      --------------------------------- IMPRESSION AND DISPOSITION ---------------------------------    IMPRESSION  1.  Infected surgical wound        DISPOSITION  Disposition: Discharge to home  Patient condition is good                  Andrews Butcher MD  05/24/21 1152

## 2021-05-28 LAB
ORGANISM: ABNORMAL
WOUND/ABSCESS: ABNORMAL

## 2021-06-02 ENCOUNTER — OFFICE VISIT (OUTPATIENT)
Dept: SURGERY | Age: 41
End: 2021-06-02

## 2021-06-02 VITALS
HEIGHT: 61 IN | SYSTOLIC BLOOD PRESSURE: 86 MMHG | BODY MASS INDEX: 21.14 KG/M2 | HEART RATE: 59 BPM | WEIGHT: 112 LBS | DIASTOLIC BLOOD PRESSURE: 57 MMHG | TEMPERATURE: 98.4 F

## 2021-06-02 DIAGNOSIS — Z87.2 HISTORY OF EPIDERMAL INCLUSION CYST EXCISION: Primary | ICD-10-CM

## 2021-06-02 DIAGNOSIS — Z98.890 HISTORY OF EPIDERMAL INCLUSION CYST EXCISION: Primary | ICD-10-CM

## 2021-06-02 PROCEDURE — 99024 POSTOP FOLLOW-UP VISIT: CPT | Performed by: SURGERY

## 2021-06-02 NOTE — PROGRESS NOTES
General Surgery Office Note  [facility]  Consandre P. Jen Benson MD    Patient's Name/Date of Birth: Charly Cuellar / [birth year]    Date: June 2, 2021     Surgeon: Jen Benson MD    Chief Complaint:   Chief Complaint   Patient presents with    Post-Op Check     excision soft tissue neoplasm of neck       Patient Active Problem List   Diagnosis    Abnormal brain MRI    History of migraine    Migraine with aura and without status migrainosus, not intractable    Tobacco use disorder       Subjective: Had surgical site infection after excision and was started on antibiotics. Had some brown drainage initially however this is cleared up. Has no pain    Objective:  BP (!) 86/57   Pulse 59   Temp 98.4 °F (36.9 °C) (Temporal)   Ht 5' 1\" (1.549 m)   Wt 112 lb (50.8 kg)   BMI 21.16 kg/m²   Labs:  No results for input(s): WBC, HGB, HCT in the last 72 hours. Invalid input(s): PLR  Lab Results   Component Value Date    CREATININE 0.6 05/13/2021    BUN 8 05/13/2021     05/13/2021    K 4.1 05/13/2021     05/13/2021    CO2 27 05/13/2021     No results for input(s): LIPASE, AMYLASE in the last 72 hours. General appearance: AA, NAD  HEENT: NCAT, PERRLA, EOMI  Lungs: Clear, equal rise bilateral  Heart: Reg  Abdomen: soft, nondistended, nontender, incisions well healed, no signs of infection, no cellulitis, no hematoma  Skin: No lesions, incisions well healed  Psych: No distress, conversive, no hallucinations  : No ulcers or lesions  Rectal: No bleeding    A complete 10 system review was performed and are otherwise negative unless mentioned in the above HPI. Specific negatives are listed below but may not include all those reviewed.     General ROS: negative obtundation, AMS  ENT ROS: negative rhinorrhea, epistaxis  Allergy and Immunology ROS: negative itchy/watery eyes or nasal congestion  Hematological and Lymphatic ROS: negative spontaneous bleeding or bruising  Endocrine ROS: negative  lethargy, mood swings, palpitations or polydipsia/polyuria  Respiratory ROS: negative sputum changes, stridor, tachypnea or wheezing  Cardiovascular ROS: negative for - loss of consciousness, murmur or orthopnea  Gastrointestinal ROS: negative for - hematochezia or hematemesis  Genito-Urinary ROS: negative for -  genital discharge or hematuria  Musculoskeletal ROS: negative for - focal weakness, gangrene  Psych/Neuro ROS: negative for - visual or auditory hallucinations, suicidal ideation      Time spent reviewing past medical, surgical, social and family history, vitals, nursing assessment and images. Imaging:  n/a    Pathology:   Diagnosis:   Skin, left neck, excision: Compatible with ruptured epidermal inclusion   cyst with associated inflammatory/granulomatous reaction and abscess   formation.      Assessment/Plan:  Valeria Hodge is a 46 y.o. female 2 weeks status post excision left neck epidermal inclusion cyst, doing well    Follow-up as needed    Physician Signature: Electronically signed by Dr. Nanci Reece  6/2/2021  1:26 PM

## 2021-07-26 ENCOUNTER — HOSPITAL ENCOUNTER (EMERGENCY)
Age: 41
Discharge: HOME | End: 2021-07-26
Attending: EMERGENCY MEDICINE
Payer: COMMERCIAL

## 2021-07-26 VITALS
HEART RATE: 64 BPM | TEMPERATURE: 97.5 F | WEIGHT: 101 LBS | OXYGEN SATURATION: 99 % | RESPIRATION RATE: 20 BRPM | DIASTOLIC BLOOD PRESSURE: 38 MMHG | SYSTOLIC BLOOD PRESSURE: 73 MMHG | BODY MASS INDEX: 19.08 KG/M2

## 2021-07-26 DIAGNOSIS — J01.90 ACUTE SINUSITIS, RECURRENCE NOT SPECIFIED, UNSPECIFIED LOCATION: Primary | ICD-10-CM

## 2021-07-26 PROCEDURE — 99283 EMERGENCY DEPT VISIT LOW MDM: CPT

## 2021-07-26 RX ORDER — AMOXICILLIN AND CLAVULANATE POTASSIUM 875; 125 MG/1; MG/1
1 TABLET, FILM COATED ORAL 2 TIMES DAILY
Qty: 20 TABLET | Refills: 0 | Status: SHIPPED | OUTPATIENT
Start: 2021-07-26 | End: 2021-08-05

## 2021-07-26 RX ORDER — BROMPHENIRAMINE MALEATE, PSEUDOEPHEDRINE HYDROCHLORIDE, AND DEXTROMETHORPHAN HYDROBROMIDE 2; 30; 10 MG/5ML; MG/5ML; MG/5ML
5 SYRUP ORAL 4 TIMES DAILY PRN
Qty: 120 ML | Refills: 0 | Status: SHIPPED | OUTPATIENT
Start: 2021-07-26 | End: 2021-11-01 | Stop reason: ALTCHOICE

## 2021-07-26 ASSESSMENT — ENCOUNTER SYMPTOMS
EYE PAIN: 0
SORE THROAT: 0
COUGH: 1
DIARRHEA: 0
EYE REDNESS: 0
RHINORRHEA: 1
NAUSEA: 0
SHORTNESS OF BREATH: 0
WHEEZING: 0
BACK PAIN: 0
VOMITING: 0
ABDOMINAL DISTENTION: 0
SINUS PRESSURE: 0
EYE DISCHARGE: 0

## 2021-07-26 NOTE — ED PROVIDER NOTES
The history is provided by the patient. URI  Presenting symptoms: congestion, cough, facial pain and rhinorrhea    Presenting symptoms: no ear pain, no fatigue, no fever and no sore throat    Severity:  Moderate  Onset quality:  Gradual  Duration:  1 week  Progression:  Worsening  Chronicity:  New  Associated symptoms: no arthralgias, no headaches and no wheezing         Review of Systems   Constitutional: Negative for chills, fatigue and fever. HENT: Positive for congestion and rhinorrhea. Negative for ear pain, sinus pressure and sore throat. Eyes: Negative for pain, discharge and redness. Respiratory: Positive for cough. Negative for shortness of breath and wheezing. Cardiovascular: Negative for chest pain. Gastrointestinal: Negative for abdominal distention, diarrhea, nausea and vomiting. Genitourinary: Negative for dysuria and frequency. Musculoskeletal: Negative for arthralgias and back pain. Skin: Negative for rash and wound. Neurological: Negative for weakness and headaches. Hematological: Negative for adenopathy. All other systems reviewed and are negative. Physical Exam  Vitals and nursing note reviewed. Constitutional:       Appearance: She is well-developed. HENT:      Head: Normocephalic and atraumatic. Right Ear: Hearing and external ear normal. Tympanic membrane is retracted. Left Ear: Hearing and external ear normal. Tympanic membrane is retracted. Nose: Mucosal edema, congestion and rhinorrhea present. Mouth/Throat:      Pharynx: Uvula midline. Eyes:      General: Lids are normal.      Conjunctiva/sclera: Conjunctivae normal.      Pupils: Pupils are equal, round, and reactive to light. Cardiovascular:      Rate and Rhythm: Normal rate and regular rhythm. Heart sounds: Normal heart sounds. No murmur heard. Pulmonary:      Effort: Pulmonary effort is normal. No respiratory distress. Breath sounds: Normal breath sounds.  No wheezing or rales. Abdominal:      General: Bowel sounds are normal.      Palpations: Abdomen is soft. Abdomen is not rigid. Tenderness: There is no abdominal tenderness. There is no guarding or rebound. Musculoskeletal:      Cervical back: Normal range of motion and neck supple. Skin:     General: Skin is warm and dry. Findings: No abrasion or rash. Neurological:      Mental Status: She is alert and oriented to person, place, and time. GCS: GCS eye subscore is 4. GCS verbal subscore is 5. GCS motor subscore is 6. Cranial Nerves: No cranial nerve deficit. Sensory: No sensory deficit. Coordination: Coordination normal.      Gait: Gait normal.          Procedures     MDM          --------------------------------------------- PAST HISTORY ---------------------------------------------  Past Medical History:  has a past medical history of Abnormal brain MRI, History of migraine, Migraine, Migraine with aura and without status migrainosus, not intractable, Substance abuse ([facility] [address] and Tobacco use disorder. Past Surgical History:  has a past surgical history that includes  section and cyst removal.    Social History:  reports that she has quit smoking. She smoked 0.50 packs per day. She has never used smokeless tobacco. She reports previous alcohol use. She reports previous drug use. Family History: family history is not on file. The patients home medications have been reviewed. Allergies: Imitrex [sumatriptan] and Naltrexone    -------------------------------------------------- RESULTS -------------------------------------------------  Labs:  No results found for this visit on 21.     Radiology:  No orders to display       ------------------------- NURSING NOTES AND VITALS REVIEWED ---------------------------  Date / Time Roomed:  2021 10:05 AM  ED Bed Assignment:      The nursing notes within the ED encounter and vital signs as below have been reviewed. BP (!) 73/38 Comment: \"always low\"  Pulse 64   Temp 97.5 °F (36.4 °C) (Temporal)   Resp 20   Wt 101 lb (45.8 kg)   LMP 07/21/2021   SpO2 99%   BMI 19.08 kg/m²   Oxygen Saturation Interpretation: Normal      ------------------------------------------ PROGRESS NOTES ------------------------------------------  I have spoken with the patient and discussed todays results, in addition to providing specific details for the plan of care and counseling regarding the diagnosis and prognosis. Their questions are answered at this time and they are agreeable with the plan. I discussed at length with them reasons for immediate return here for re evaluation. They will followup with primary care by calling their office tomorrow. --------------------------------- ADDITIONAL PROVIDER NOTES ---------------------------------  At this time the patient is without objective evidence of an acute process requiring hospitalization or inpatient management. They have remained hemodynamically stable throughout their entire ED visit and are stable for discharge with outpatient follow-up. The plan has been discussed in detail and they are aware of the specific conditions for emergent return, as well as the importance of follow-up. New Prescriptions    AMOXICILLIN-CLAVULANATE (AUGMENTIN) 875-125 MG PER TABLET    Take 1 tablet by mouth 2 times daily for 10 days    BROMPHENIRAMINE-PSEUDOEPHEDRINE-DM 2-30-10 MG/5ML SYRUP    Take 5 mLs by mouth 4 times daily as needed for Congestion or Cough       Diagnosis:  1. Acute sinusitis, recurrence not specified, unspecified location        Disposition:  Patient's disposition: Discharge to home  Patient's condition is stable.                       Marlene Stafford MD  07/26/21 [id number]

## 2021-11-01 ENCOUNTER — HOSPITAL ENCOUNTER (EMERGENCY)
Age: 41
Discharge: HOME | End: 2021-11-01
Attending: EMERGENCY MEDICINE
Payer: COMMERCIAL

## 2021-11-01 VITALS
TEMPERATURE: 97.5 F | DIASTOLIC BLOOD PRESSURE: 58 MMHG | BODY MASS INDEX: 19.46 KG/M2 | RESPIRATION RATE: 16 BRPM | SYSTOLIC BLOOD PRESSURE: 111 MMHG | OXYGEN SATURATION: 99 % | WEIGHT: 103 LBS | HEART RATE: 62 BPM

## 2021-11-01 DIAGNOSIS — Z20.822 SUSPECTED COVID-19 VIRUS INFECTION: Primary | ICD-10-CM

## 2021-11-01 PROCEDURE — U0003 INFECTIOUS AGENT DETECTION BY NUCLEIC ACID (DNA OR RNA); SEVERE ACUTE RESPIRATORY SYNDROME CORONAVIRUS 2 (SARS-COV-2) (CORONAVIRUS DISEASE [COVID-19]), AMPLIFIED PROBE TECHNIQUE, MAKING USE OF HIGH THROUGHPUT TECHNOLOGIES AS DESCRIBED BY CMS-2020-01-R: HCPCS

## 2021-11-01 PROCEDURE — U0005 INFEC AGEN DETEC AMPLI PROBE: HCPCS

## 2021-11-01 PROCEDURE — 99282 EMERGENCY DEPT VISIT SF MDM: CPT

## 2021-11-01 RX ORDER — BROMPHENIRAMINE MALEATE, PSEUDOEPHEDRINE HYDROCHLORIDE, AND DEXTROMETHORPHAN HYDROBROMIDE 2; 30; 10 MG/5ML; MG/5ML; MG/5ML
5 SYRUP ORAL 4 TIMES DAILY PRN
Qty: 120 ML | Refills: 0 | Status: SHIPPED | OUTPATIENT
Start: 2021-11-01 | End: 2021-11-29 | Stop reason: ALTCHOICE

## 2021-11-01 ASSESSMENT — ENCOUNTER SYMPTOMS
ABDOMINAL DISTENTION: 0
DIARRHEA: 0
WHEEZING: 0
EYE REDNESS: 0
SHORTNESS OF BREATH: 0
SORE THROAT: 0
NAUSEA: 0
RHINORRHEA: 1
EYE DISCHARGE: 0
VOMITING: 0
BACK PAIN: 0
EYE PAIN: 0
COUGH: 1
SINUS PRESSURE: 0

## 2021-11-01 ASSESSMENT — PAIN DESCRIPTION - PROGRESSION
CLINICAL_PROGRESSION: GRADUALLY WORSENING
CLINICAL_PROGRESSION: NOT CHANGED

## 2021-11-01 ASSESSMENT — PAIN DESCRIPTION - PAIN TYPE: TYPE: ACUTE PAIN

## 2021-11-01 ASSESSMENT — PAIN DESCRIPTION - ONSET: ONSET: GRADUAL

## 2021-11-01 ASSESSMENT — PAIN DESCRIPTION - LOCATION: LOCATION: HEAD

## 2021-11-01 ASSESSMENT — PAIN DESCRIPTION - DESCRIPTORS: DESCRIPTORS: ACHING

## 2021-11-01 ASSESSMENT — PAIN SCALES - GENERAL: PAINLEVEL_OUTOF10: 7

## 2021-11-01 NOTE — ED PROVIDER NOTES
EXPOSURE TO COVID AT HOME    The history is provided by the patient. Illness   The current episode started 2 days ago. The onset was sudden. Associated symptoms include a fever, congestion, headaches, rhinorrhea, muscle aches and cough. Pertinent negatives include no diarrhea, no nausea, no vomiting, no ear pain, no sore throat, no wheezing, no rash, no eye discharge, no eye pain and no eye redness. Review of Systems   Constitutional: Positive for fever. Negative for chills. HENT: Positive for congestion and rhinorrhea. Negative for ear pain, sinus pressure and sore throat. Eyes: Negative for pain, discharge and redness. Respiratory: Positive for cough. Negative for shortness of breath and wheezing. Cardiovascular: Negative for chest pain. Gastrointestinal: Negative for abdominal distention, diarrhea, nausea and vomiting. Genitourinary: Negative for dysuria and frequency. Musculoskeletal: Negative for arthralgias and back pain. Skin: Negative for rash and wound. Neurological: Positive for headaches. Negative for weakness. Hematological: Negative for adenopathy. All other systems reviewed and are negative. Physical Exam  Vitals and nursing note reviewed. Constitutional:       Appearance: She is well-developed. HENT:      Head: Normocephalic and atraumatic. Right Ear: Tympanic membrane is retracted. Left Ear: Tympanic membrane is retracted. Nose: Mucosal edema and congestion present. Eyes:      Pupils: Pupils are equal, round, and reactive to light. Cardiovascular:      Rate and Rhythm: Normal rate and regular rhythm. Heart sounds: Normal heart sounds. No murmur heard. Pulmonary:      Effort: Pulmonary effort is normal. No respiratory distress. Breath sounds: Normal breath sounds. No wheezing or rales. Abdominal:      General: Bowel sounds are normal.      Palpations: Abdomen is soft. Tenderness: There is no abdominal tenderness. There is no guarding or rebound. Musculoskeletal:      Cervical back: Normal range of motion and neck supple. Skin:     General: Skin is warm and dry. Neurological:      Mental Status: She is alert and oriented to person, place, and time. Cranial Nerves: No cranial nerve deficit. Coordination: Coordination normal.          Procedures     [facility]          --------------------------------------------- PAST HISTORY ---------------------------------------------  Past Medical History:  has a past medical history of Abnormal brain MRI, History of migraine, Migraine, Migraine with aura and without status migrainosus, not intractable, Substance abuse ([facility] [address] and Tobacco use disorder. Past Surgical History:  has a past surgical history that includes  section and cyst removal.    Social History:  reports that she has quit smoking. She smoked 0.50 packs per day. She has never used smokeless tobacco. She reports previous alcohol use. She reports previous drug use. Family History: family history is not on file. The patients home medications have been reviewed. Allergies: Imitrex [sumatriptan] and Naltrexone    -------------------------------------------------- RESULTS -------------------------------------------------  Labs:  No results found for this visit on 21. Radiology:  No orders to display       ------------------------- NURSING NOTES AND VITALS REVIEWED ---------------------------  Date / Time Roomed:  2021 12:27 PM  ED Bed Assignment:      The nursing notes within the ED encounter and vital signs as below have been reviewed.    BP (!) 111/58   Pulse 62   Temp 97.5 °F (36.4 °C) (Temporal)   Resp 16   Wt 103 lb (46.7 kg)   LMP 10/28/2021   SpO2 99%   BMI 19.46 kg/m²   Oxygen Saturation Interpretation: Normal      ------------------------------------------ PROGRESS NOTES ------------------------------------------  I have spoken with the patient and discussed todays results, in addition to providing specific details for the plan of care and counseling regarding the diagnosis and prognosis. Their questions are answered at this time and they are agreeable with the plan. I discussed at length with them reasons for immediate return here for re evaluation. They will followup with primary care by calling their office tomorrow. PCR COVID TEST DONE AS A SEND OUT      --------------------------------- ADDITIONAL PROVIDER NOTES ---------------------------------  At this time the patient is without objective evidence of an acute process requiring hospitalization or inpatient management. They have remained hemodynamically stable throughout their entire ED visit and are stable for discharge with outpatient follow-up. The plan has been discussed in detail and they are aware of the specific conditions for emergent return, as well as the importance of follow-up. New Prescriptions    BROMPHENIRAMINE-PSEUDOEPHEDRINE-DM 2-30-10 MG/5ML SYRUP    Take 5 mLs by mouth 4 times daily as needed for Congestion or Cough       Diagnosis:  1. Suspected COVID-19 virus infection        Disposition:  Patient's disposition: Discharge to home  Patient's condition is stable.                       Jeovany Kenney MD  11/01/21 1257

## 2021-11-01 NOTE — Clinical Note
Xenia Tobar was seen and treated in our emergency department on 11/1/2021. COVID19 virus is suspected. Per the CDC guidelines we recommend home isolation until the following conditions are all met:    1. At least 10 days have passed since symptoms first appeared and  2. At least 24 hours have passed since last fever without the use of fever-reducing medications and  3. Symptoms (e.g., cough, shortness of breath) have improved    If you have any questions or concerns, please don't hesitate to call.     She may return to work/school on 11/13/2021        Kaleta Dakin, MD

## 2021-11-02 ENCOUNTER — CARE COORDINATION (OUTPATIENT)
Dept: CARE COORDINATION | Age: 41
End: 2021-11-02

## 2021-11-02 LAB — SARS-COV-2, PCR: NOT DETECTED

## 2021-11-02 NOTE — CARE COORDINATION
Patient contacted regarding COVID-19 risk, exposure, diagnosis, pulse oximeter ordered at discharge and monoclonal antibody infusion follow up. Discussed COVID-19 related testing which was pending at this time. Test results were pending. Patient informed of results, if available? Yes. LPN Care Coordinator contacted the patient by telephone to perform post discharge assessment. Call within 2 business days of discharge: Yes. Verified name and  with patient as identifiers. Provided introduction to self, and explanation of the CTN/ACM role, and reason for call due to risk factors for infection and/or exposure to COVID-19. Symptoms reviewed with patient who verbalized the following symptoms: fatigue, pain or aching joints and cough. Due to no new or worsening symptoms encounter was not routed to provider for escalation. Discussed follow-up appointments. If no appointment was previously scheduled, appointment scheduling offered: No.  [address] follow up appointment(s): No future appointments. Non-[facility] follow up appointment(s): pt states she is feeling a little better and is to follow up with pcp reviewed cdc guidelines and reminded pt to return to er if symptoms persist     Non-face-to-face services provided:  Obtained and reviewed discharge summary and/or continuity of care documents     Advance Care Planning:   Does patient have an Advance Directive:  not on file. Educated patient about risk for severe COVID-19 due to risk factors according to CDC guidelines. LPN CC reviewed discharge instructions, medical action plan and red flag symptoms with the patient who verbalized understanding. Discussed COVID vaccination status: Yes. Education provided on COVID-19 vaccination as appropriate. Discussed exposure protocols and quarantine with CDC Guidelines.  Patient was given an opportunity to verbalize any questions and concerns and agrees to contact LPN CC or health care provider for questions related to their healthcare. Reviewed and educated patient on any new and changed medications related to discharge diagnosis     Was patient discharged with a pulse oximeter? No Discussed and confirmed pulse oximeter discharge instructions and when to notify provider or seek emergency care. LPN CC provided contact information. No further follow-up call identified based on severity of symptoms and risk factors.

## 2021-11-29 ENCOUNTER — HOSPITAL ENCOUNTER (EMERGENCY)
Age: 41
Discharge: HOME | End: 2021-11-29
Attending: EMERGENCY MEDICINE
Payer: COMMERCIAL

## 2021-11-29 VITALS
HEART RATE: 72 BPM | SYSTOLIC BLOOD PRESSURE: 122 MMHG | BODY MASS INDEX: 19.27 KG/M2 | TEMPERATURE: 97.8 F | OXYGEN SATURATION: 99 % | DIASTOLIC BLOOD PRESSURE: 61 MMHG | RESPIRATION RATE: 18 BRPM | WEIGHT: 102 LBS

## 2021-11-29 DIAGNOSIS — Z20.822 SUSPECTED COVID-19 VIRUS INFECTION: Primary | ICD-10-CM

## 2021-11-29 PROCEDURE — U0005 INFEC AGEN DETEC AMPLI PROBE: HCPCS

## 2021-11-29 PROCEDURE — U0003 INFECTIOUS AGENT DETECTION BY NUCLEIC ACID (DNA OR RNA); SEVERE ACUTE RESPIRATORY SYNDROME CORONAVIRUS 2 (SARS-COV-2) (CORONAVIRUS DISEASE [COVID-19]), AMPLIFIED PROBE TECHNIQUE, MAKING USE OF HIGH THROUGHPUT TECHNOLOGIES AS DESCRIBED BY CMS-2020-01-R: HCPCS

## 2021-11-29 PROCEDURE — 99282 EMERGENCY DEPT VISIT SF MDM: CPT

## 2021-11-29 RX ORDER — BROMPHENIRAMINE MALEATE, PSEUDOEPHEDRINE HYDROCHLORIDE, AND DEXTROMETHORPHAN HYDROBROMIDE 2; 30; 10 MG/5ML; MG/5ML; MG/5ML
5 SYRUP ORAL 4 TIMES DAILY PRN
Qty: 120 ML | Refills: 0 | Status: SHIPPED | OUTPATIENT
Start: 2021-11-29 | End: 2022-01-01 | Stop reason: ALTCHOICE

## 2021-11-29 ASSESSMENT — ENCOUNTER SYMPTOMS
SINUS PRESSURE: 0
WHEEZING: 0
ABDOMINAL DISTENTION: 0
VOMITING: 0
COUGH: 0
BACK PAIN: 0
SORE THROAT: 0
SHORTNESS OF BREATH: 0
EYE REDNESS: 0
EYE DISCHARGE: 0
DIARRHEA: 0
EYE PAIN: 0
NAUSEA: 1
RHINORRHEA: 1

## 2021-11-29 ASSESSMENT — PAIN SCALES - GENERAL: PAINLEVEL_OUTOF10: 7

## 2021-11-29 ASSESSMENT — PAIN DESCRIPTION - DESCRIPTORS: DESCRIPTORS: ACHING

## 2021-11-29 ASSESSMENT — PAIN DESCRIPTION - PAIN TYPE: TYPE: ACUTE PAIN

## 2021-11-29 ASSESSMENT — PAIN DESCRIPTION - ORIENTATION: ORIENTATION: ANTERIOR;UPPER

## 2021-11-29 ASSESSMENT — PAIN DESCRIPTION - LOCATION: LOCATION: CHEST

## 2021-11-29 NOTE — ED PROVIDER NOTES
EXPOSURE TO COVID    LOSS OF SMELL AND TASTE    The history is provided by the patient. Illness   The current episode started 5 to 7 days ago. The onset was sudden. The problem is moderate. Associated symptoms include nausea, congestion, headaches, rhinorrhea and muscle aches. Pertinent negatives include no fever, no diarrhea, no vomiting, no ear pain, no sore throat, no cough, no wheezing, no rash, no eye discharge, no eye pain and no eye redness. Review of Systems   Constitutional: Negative for chills and fever. HENT: Positive for congestion and rhinorrhea. Negative for ear pain, sinus pressure and sore throat. Eyes: Negative for pain, discharge and redness. Respiratory: Negative for cough, shortness of breath and wheezing. Cardiovascular: Negative for chest pain. Gastrointestinal: Positive for nausea. Negative for abdominal distention, diarrhea and vomiting. Genitourinary: Negative for dysuria and frequency. Musculoskeletal: Negative for arthralgias and back pain. Skin: Negative for rash and wound. Neurological: Positive for headaches. Negative for weakness. Hematological: Negative for adenopathy. All other systems reviewed and are negative. Physical Exam  Vitals and nursing note reviewed. Constitutional:       Appearance: She is well-developed. HENT:      Head: Normocephalic and atraumatic. Right Ear: Hearing and external ear normal. Tympanic membrane is retracted. Left Ear: Hearing and external ear normal. Tympanic membrane is retracted. Nose: Mucosal edema and congestion present. Mouth/Throat:      Pharynx: Uvula midline. Eyes:      General: Lids are normal.      Conjunctiva/sclera: Conjunctivae normal.      Pupils: Pupils are equal, round, and reactive to light. Cardiovascular:      Rate and Rhythm: Normal rate and regular rhythm. Heart sounds: Normal heart sounds. No murmur heard.       Pulmonary:      Effort: Pulmonary effort is normal. No respiratory distress. Breath sounds: Normal breath sounds. No wheezing or rales. Abdominal:      General: Bowel sounds are normal.      Palpations: Abdomen is soft. Abdomen is not rigid. Tenderness: There is no abdominal tenderness. There is no guarding or rebound. Musculoskeletal:      Cervical back: Normal range of motion and neck supple. Skin:     General: Skin is warm and dry. Findings: No abrasion or rash. Neurological:      Mental Status: She is alert and oriented to person, place, and time. GCS: GCS eye subscore is 4. GCS verbal subscore is 5. GCS motor subscore is 6. Cranial Nerves: No cranial nerve deficit. Sensory: No sensory deficit. Coordination: Coordination normal.      Gait: Gait normal.          Procedures            --------------------------------------------- PAST HISTORY ---------------------------------------------  Past Medical History:  has a past medical history of Abnormal brain MRI, History of migraine, Migraine, Migraine with aura and without status migrainosus, not intractable, Substance abuse ([facility] [address] and Tobacco use disorder. Past Surgical History:  has a past surgical history that includes  section and cyst removal.    Social History:  reports that she has quit smoking. She smoked 0.50 packs per day. She has never used smokeless tobacco. She reports previous alcohol use. She reports previous drug use. Family History: family history is not on file. The patients home medications have been reviewed. Allergies: Imitrex [sumatriptan] and Naltrexone    -------------------------------------------------- RESULTS -------------------------------------------------  Labs:  No results found for this visit on 21.     Radiology:  No orders to display       ------------------------- NURSING NOTES AND VITALS REVIEWED ---------------------------  Date / Time Roomed:  2021 10:52 AM  ED Bed Assignment:      The

## 2021-11-29 NOTE — Clinical Note
Alf He was seen and treated in our emergency department on 11/29/2021. COVID19 virus is suspected. Per the CDC guidelines we recommend home isolation until the following conditions are all met:    1. At least 10 days have passed since symptoms first appeared and  2. At least 24 hours have passed since last fever without the use of fever-reducing medications and  3. Symptoms (e.g., cough, shortness of breath) have improved    If you have any questions or concerns, please don't hesitate to call.     She may return to work/school on 12/08/2021    COVID TEST RESULTS ARE PENDING AT Joe Weir MD

## 2021-11-30 LAB — SARS-COV-2, PCR: NOT DETECTED

## 2021-12-02 ENCOUNTER — HOSPITAL ENCOUNTER (OUTPATIENT)
Dept: ULTRASOUND IMAGING | Age: 41
Discharge: HOME | End: 2021-12-04
Payer: COMMERCIAL

## 2021-12-02 DIAGNOSIS — R10.2 PELVIC PAIN IN FEMALE: ICD-10-CM

## 2021-12-02 PROCEDURE — 76856 US EXAM PELVIC COMPLETE: CPT

## 2021-12-05 ENCOUNTER — HOSPITAL ENCOUNTER (EMERGENCY)
Age: 41
Discharge: HOME | End: 2021-12-05
Attending: EMERGENCY MEDICINE
Payer: COMMERCIAL

## 2021-12-05 VITALS
WEIGHT: 105 LBS | BODY MASS INDEX: 19.83 KG/M2 | OXYGEN SATURATION: 98 % | TEMPERATURE: 97.1 F | RESPIRATION RATE: 16 BRPM | HEIGHT: 61 IN | SYSTOLIC BLOOD PRESSURE: 115 MMHG | HEART RATE: 80 BPM | DIASTOLIC BLOOD PRESSURE: 76 MMHG

## 2021-12-05 DIAGNOSIS — G43.001 MIGRAINE WITHOUT AURA AND WITH STATUS MIGRAINOSUS, NOT INTRACTABLE: Primary | ICD-10-CM

## 2021-12-05 PROCEDURE — 96372 THER/PROPH/DIAG INJ SC/IM: CPT

## 2021-12-05 PROCEDURE — 99283 EMERGENCY DEPT VISIT LOW MDM: CPT

## 2021-12-05 PROCEDURE — 6360000002 HC RX W HCPCS: Performed by: EMERGENCY MEDICINE

## 2021-12-05 PROCEDURE — 6370000000 HC RX 637 (ALT 250 FOR IP): Performed by: EMERGENCY MEDICINE

## 2021-12-05 RX ORDER — KETOROLAC TROMETHAMINE 30 MG/ML
30 INJECTION, SOLUTION INTRAMUSCULAR; INTRAVENOUS ONCE
Status: COMPLETED | OUTPATIENT
Start: 2021-12-05 | End: 2021-12-05

## 2021-12-05 RX ORDER — INDOMETHACIN 50 MG/1
50 CAPSULE ORAL 2 TIMES DAILY WITH MEALS
Qty: 14 CAPSULE | Refills: 0 | OUTPATIENT
Start: 2021-12-05 | End: 2022-01-01

## 2021-12-05 RX ORDER — ONDANSETRON 4 MG/1
4 TABLET, ORALLY DISINTEGRATING ORAL EVERY 8 HOURS PRN
Qty: 10 TABLET | Refills: 0 | Status: SHIPPED | OUTPATIENT
Start: 2021-12-05 | End: 2022-12-05

## 2021-12-05 RX ORDER — ONDANSETRON 4 MG/1
4 TABLET, ORALLY DISINTEGRATING ORAL ONCE
Status: COMPLETED | OUTPATIENT
Start: 2021-12-05 | End: 2021-12-05

## 2021-12-05 RX ADMIN — ONDANSETRON 4 MG: 4 TABLET, ORALLY DISINTEGRATING ORAL at 11:32

## 2021-12-05 RX ADMIN — KETOROLAC TROMETHAMINE 30 MG: 30 INJECTION, SOLUTION INTRAMUSCULAR at 11:32

## 2021-12-05 ASSESSMENT — ENCOUNTER SYMPTOMS
EYE DISCHARGE: 0
PHOTOPHOBIA: 1
EYE PAIN: 0
ABDOMINAL DISTENTION: 0
SHORTNESS OF BREATH: 0
VOMITING: 0
NAUSEA: 1
SINUS PRESSURE: 0
EYE REDNESS: 0
COUGH: 0
DIARRHEA: 0
WHEEZING: 0
BACK PAIN: 0
SORE THROAT: 0

## 2021-12-05 ASSESSMENT — PAIN DESCRIPTION - DESCRIPTORS: DESCRIPTORS: ACHING

## 2021-12-05 ASSESSMENT — PAIN SCALES - GENERAL
PAINLEVEL_OUTOF10: 8
PAINLEVEL_OUTOF10: 10
PAINLEVEL_OUTOF10: 10

## 2021-12-05 ASSESSMENT — PAIN DESCRIPTION - PAIN TYPE: TYPE: ACUTE PAIN

## 2021-12-05 ASSESSMENT — PAIN DESCRIPTION - PROGRESSION
CLINICAL_PROGRESSION: GRADUALLY IMPROVING
CLINICAL_PROGRESSION: NOT CHANGED

## 2021-12-05 ASSESSMENT — PAIN DESCRIPTION - LOCATION: LOCATION: HEAD

## 2021-12-05 ASSESSMENT — PAIN DESCRIPTION - FREQUENCY: FREQUENCY: CONTINUOUS

## 2021-12-05 NOTE — ED PROVIDER NOTES
Known history of migraine, 3 days duration for this episode with nausea    The history is provided by the patient. Headache  Pain location:  Frontal  Quality:  Dull  Radiates to:  Does not radiate  Severity currently:  7/10  Severity at highest:  8/10  Onset quality:  Gradual  Timing:  Constant  Progression:  Unchanged  Chronicity:  Chronic  Similar to prior headaches: yes    Context: activity and bright light    Relieved by:  Nothing  Worsened by: Activity and light  Ineffective treatments:  None tried  Associated symptoms: nausea and photophobia    Associated symptoms: no back pain, no cough, no diarrhea, no ear pain, no eye pain, no fever, no sinus pressure, no sore throat, no vomiting and no weakness         Review of Systems   Constitutional: Positive for activity change. Negative for chills and fever. HENT: Negative for ear pain, sinus pressure and sore throat. Eyes: Positive for photophobia. Negative for pain, discharge and redness. Respiratory: Negative for cough, shortness of breath and wheezing. Cardiovascular: Negative for chest pain. Gastrointestinal: Positive for nausea. Negative for abdominal distention, diarrhea and vomiting. Genitourinary: Negative for dysuria and frequency. Musculoskeletal: Negative for arthralgias and back pain. Skin: Negative for rash and wound. Neurological: Positive for headaches. Negative for weakness. Hematological: Negative for adenopathy. Psychiatric/Behavioral: Negative. All other systems reviewed and are negative. Physical Exam  Vitals and nursing note reviewed. Constitutional:       Appearance: She is well-developed. HENT:      Head: Normocephalic and atraumatic. Right Ear: Tympanic membrane normal.      Left Ear: Tympanic membrane normal.   Eyes:      Pupils: Pupils are equal, round, and reactive to light. Cardiovascular:      Rate and Rhythm: Normal rate and regular rhythm. Heart sounds: Normal heart sounds.  No murmur heard. Pulmonary:      Effort: Pulmonary effort is normal.      Breath sounds: Normal breath sounds. Abdominal:      General: Bowel sounds are normal.      Palpations: Abdomen is soft. Tenderness: There is no abdominal tenderness. There is no guarding or rebound. Musculoskeletal:      Cervical back: Normal range of motion and neck supple. Skin:     General: Skin is warm and dry. Neurological:      Mental Status: She is alert and oriented to person, place, and time. Psychiatric:         Behavior: Behavior normal.         Thought Content: Thought content normal.         Judgment: Judgment normal.        --------------------------------------------- PAST HISTORY ---------------------------------------------  Past Medical History:  has a past medical history of Abnormal brain MRI, History of migraine, Migraine, Migraine with aura and without status migrainosus, not intractable, Substance abuse ([facility] [address] and Tobacco use disorder. Past Surgical History:  has a past surgical history that includes  section and cyst removal.    Social History:  reports that she has quit smoking. She smoked 0.50 packs per day. She has never used smokeless tobacco. She reports previous alcohol use. She reports previous drug use. Family History: family history is not on file. The patients home medications have been reviewed. Allergies: Imitrex [sumatriptan] and Naltrexone    -------------------------------------------------- RESULTS -------------------------------------------------  No results found for this visit on 21. No orders to display       ------------------------- NURSING NOTES AND VITALS REVIEWED ---------------------------   The nursing notes within the ED encounter and vital signs as below have been reviewed.    /76   Pulse 80   Temp 97.1 °F (36.2 °C) (Temporal)   Resp 16   Ht 5' 1\" (1.549 m)   Wt 105 lb (47.6 kg)   LMP 2021   SpO2 98%   BMI 19.84 kg/m²   Oxygen Saturation Interpretation: Normal      ------------------------------------------ PROGRESS NOTES ------------------------------------------   I have spoken with the patient and discussed todays results, in addition to providing specific details for the plan of care and counseling regarding the diagnosis and prognosis. Their questions are answered at this time and they are agreeable with the plan.      --------------------------------- ADDITIONAL PROVIDER NOTES ---------------------------------        This patient is stable for discharge. I have shared the specific conditions for return, as well as the importance of follow-up. IMPRESSION:     1.  Migraine without aura and with status migrainosus, not intractable      Patient's Medications   New Prescriptions    INDOMETHACIN (INDOCIN) 50 MG CAPSULE    Take 1 capsule by mouth 2 times daily (with meals) for 7 days    ONDANSETRON (ZOFRAN ODT) 4 MG DISINTEGRATING TABLET    Take 1 tablet by mouth every 8 hours as needed for Nausea or Vomiting   Previous Medications    BROMPHENIRAMINE-PSEUDOEPHEDRINE-DM 2-30-10 MG/5ML SYRUP    Take 5 mLs by mouth 4 times daily as needed for Congestion or Cough    BUSPIRONE HCL (BUSPAR PO)    Take 20 mg by mouth 2 times daily   Modified Medications    No medications on file   Discontinued Medications    No medications on file         Procedures     MDM                  [facility], DO  12/05/21 [id number]

## 2022-01-01 ENCOUNTER — HOSPITAL ENCOUNTER (EMERGENCY)
Age: 42
Discharge: HOME | End: 2022-01-01
Attending: EMERGENCY MEDICINE
Payer: COMMERCIAL

## 2022-01-01 VITALS
HEIGHT: 61 IN | DIASTOLIC BLOOD PRESSURE: 57 MMHG | BODY MASS INDEX: 19.83 KG/M2 | OXYGEN SATURATION: 98 % | WEIGHT: 105 LBS | RESPIRATION RATE: 16 BRPM | TEMPERATURE: 97.8 F | HEART RATE: 81 BPM | SYSTOLIC BLOOD PRESSURE: 93 MMHG

## 2022-01-01 DIAGNOSIS — Z20.822 CONTACT WITH AND (SUSPECTED) EXPOSURE TO COVID-19: ICD-10-CM

## 2022-01-01 DIAGNOSIS — B34.9 VIRAL ILLNESS: Primary | ICD-10-CM

## 2022-01-01 PROCEDURE — U0003 INFECTIOUS AGENT DETECTION BY NUCLEIC ACID (DNA OR RNA); SEVERE ACUTE RESPIRATORY SYNDROME CORONAVIRUS 2 (SARS-COV-2) (CORONAVIRUS DISEASE [COVID-19]), AMPLIFIED PROBE TECHNIQUE, MAKING USE OF HIGH THROUGHPUT TECHNOLOGIES AS DESCRIBED BY CMS-2020-01-R: HCPCS

## 2022-01-01 PROCEDURE — U0005 INFEC AGEN DETEC AMPLI PROBE: HCPCS

## 2022-01-01 PROCEDURE — 99282 EMERGENCY DEPT VISIT SF MDM: CPT

## 2022-01-01 RX ORDER — IBUPROFEN 400 MG/1
TABLET ORAL
Qty: 60 TABLET | Refills: 1 | Status: SHIPPED | OUTPATIENT
Start: 2022-01-01

## 2022-01-01 RX ORDER — BROMPHENIRAMINE MALEATE, PSEUDOEPHEDRINE HYDROCHLORIDE, AND DEXTROMETHORPHAN HYDROBROMIDE 2; 30; 10 MG/5ML; MG/5ML; MG/5ML
5 SYRUP ORAL 4 TIMES DAILY PRN
Qty: 118 ML | Refills: 1 | Status: SHIPPED | OUTPATIENT
Start: 2022-01-01

## 2022-01-01 ASSESSMENT — ENCOUNTER SYMPTOMS
EYE REDNESS: 0
ABDOMINAL PAIN: 0
SINUS PRESSURE: 0
WHEEZING: 0
COUGH: 1
BACK PAIN: 1
NAUSEA: 0
SORE THROAT: 0
EYE PAIN: 0
CONSTIPATION: 0
VOMITING: 0
SHORTNESS OF BREATH: 0
DIARRHEA: 0

## 2022-01-01 ASSESSMENT — PAIN DESCRIPTION - FREQUENCY: FREQUENCY: CONTINUOUS

## 2022-01-01 ASSESSMENT — PAIN DESCRIPTION - PROGRESSION
CLINICAL_PROGRESSION: NOT CHANGED
CLINICAL_PROGRESSION: NOT CHANGED

## 2022-01-01 ASSESSMENT — PAIN DESCRIPTION - PAIN TYPE: TYPE: ACUTE PAIN

## 2022-01-01 ASSESSMENT — PAIN DESCRIPTION - LOCATION: LOCATION: BACK

## 2022-01-01 ASSESSMENT — PAIN DESCRIPTION - ONSET: ONSET: SUDDEN

## 2022-01-01 ASSESSMENT — PAIN DESCRIPTION - DESCRIPTORS: DESCRIPTORS: ACHING;CONSTANT

## 2022-01-01 NOTE — ED PROVIDER NOTES
Chief Complaint   Patient presents with    Cough     Cough pain in back and chills Symptoms started Thursday morning Neg COVID test 11/29   : had concerns including Cough (Cough pain in back and chills Symptoms started Thursday morning Neg COVID test 11/29). HPI    Review of Systems   Constitutional: Positive for chills. Negative for fever. HENT: Positive for congestion. Negative for ear pain, sinus pressure and sore throat. Eyes: Negative for pain and redness. Respiratory: Positive for cough. Negative for shortness of breath and wheezing. Cardiovascular: Negative for chest pain. Gastrointestinal: Negative for abdominal pain, constipation, diarrhea, nausea and vomiting. Genitourinary: Negative for dysuria, frequency and urgency. Musculoskeletal: Positive for back pain. Negative for arthralgias. Skin: Negative for rash and wound. Neurological: Negative for weakness and headaches. Hematological: Negative for adenopathy. All other systems reviewed and are negative. Physical Exam  Vitals and nursing note reviewed. Constitutional:       Appearance: She is well-developed. HENT:      Head: Normocephalic and atraumatic. Right Ear: Tympanic membrane, ear canal and external ear normal.      Left Ear: Tympanic membrane, ear canal and external ear normal.      Nose: Rhinorrhea present. Mouth/Throat:      Mouth: Mucous membranes are moist.      Pharynx: Oropharynx is clear. Uvula midline. Eyes:      Pupils: Pupils are equal, round, and reactive to light. Cardiovascular:      Rate and Rhythm: Normal rate and regular rhythm. Heart sounds: Normal heart sounds. No murmur heard. Pulmonary:      Effort: Pulmonary effort is normal.      Breath sounds: Normal breath sounds. No transmitted upper airway sounds. No decreased breath sounds, wheezing, rhonchi or rales. Abdominal:      General: Bowel sounds are normal.      Palpations: Abdomen is soft. Tenderness:  There is no abdominal tenderness. There is no guarding or rebound. Musculoskeletal:      Cervical back: Normal range of motion and neck supple. Skin:     General: Skin is warm and dry. Neurological:      Mental Status: She is alert and oriented to person, place, and time. Procedures    [facility]            --------------------------------------------- PAST HISTORY ---------------------------------------------  Past Medical History:  has a past medical history of Abnormal brain MRI, History of migraine, Migraine, Migraine with aura and without status migrainosus, not intractable, Substance abuse ([facility] [address] and Tobacco use disorder. Past Surgical History:  has a past surgical history that includes  section and cyst removal.    Social History:  reports that she has quit smoking. She smoked 0.50 packs per day. She has never used smokeless tobacco. She reports previous alcohol use. She reports previous drug use. Family History: family history is not on file. The patients home medications have been reviewed. Allergies: Imitrex [sumatriptan] and Naltrexone    -------------------------------------------------- RESULTS -------------------------------------------------  No results found for this visit on 22. No orders to display       ------------------------- NURSING NOTES AND VITALS REVIEWED ---------------------------   The nursing notes within the ED encounter and vital signs as below have been reviewed. BP (!) 93/57   Pulse 81   Temp 97.8 °F (36.6 °C) (Temporal)   Resp 16   Ht 5' 1\" (1.549 m)   Wt 105 lb (47.6 kg)   LMP 2021   SpO2 98%   BMI 19.84 kg/m²   Oxygen Saturation Interpretation: Normal      ------------------------------------------ PROGRESS NOTES ------------------------------------------   I have spoken with the patient and discussed todays results, in addition to providing specific details for the plan of care and counseling regarding the diagnosis and prognosis.   Their questions are answered at this time and they are agreeable with the plan. Discharge diagnoses: #1 Viral illness, #2 Contact with and (suspected) exposure to COVID-19.  --------------------------------- ADDITIONAL PROVIDER NOTES ---------------------------------     This patient is stable for discharge. I have shared the specific conditions for return, as well as the importance of follow-up.              Naeem Angeles,   01/01/22 8057

## 2022-01-01 NOTE — Clinical Note
Zahra Ladd was seen and treated in our emergency department on 1/1/2022. COVID19 virus is suspected. Per the CDC guidelines we recommend home isolation until the following conditions are all met:    1. At least 10 days have passed since symptoms first appeared and  2. At least 24 hours have passed since last fever without the use of fever-reducing medications and  3. Symptoms (e.g., cough, shortness of breath) have improved    If you have any questions or concerns, please don't hesitate to call.     She may return to work/school on 01/10/2022        Julienne Halsted,

## 2022-01-02 LAB — SARS-COV-2, PCR: DETECTED

## 2022-03-16 ENCOUNTER — HOSPITAL ENCOUNTER (INPATIENT)
Age: 42
LOS: 3 days | Discharge: LEFT BEFORE DISCHARGE | DRG: 194 | End: 2022-03-19
Attending: EMERGENCY MEDICINE | Admitting: INTERNAL MEDICINE
Payer: COMMERCIAL

## 2022-03-16 ENCOUNTER — APPOINTMENT (OUTPATIENT)
Dept: GENERAL RADIOLOGY | Age: 42
DRG: 194 | End: 2022-03-16
Payer: COMMERCIAL

## 2022-03-16 ENCOUNTER — APPOINTMENT (OUTPATIENT)
Dept: CT IMAGING | Age: 42
DRG: 194 | End: 2022-03-16
Payer: COMMERCIAL

## 2022-03-16 DIAGNOSIS — J18.9 PNEUMONIA OF LEFT UPPER LOBE DUE TO INFECTIOUS ORGANISM: ICD-10-CM

## 2022-03-16 DIAGNOSIS — R91.8 LUNG MASS: Primary | ICD-10-CM

## 2022-03-16 PROBLEM — F11.21 HEROIN USE DISORDER, SEVERE, IN SUSTAINED REMISSION (HCC): Status: ACTIVE | Noted: 2022-03-16

## 2022-03-16 PROBLEM — J44.9 COPD SUGGESTED BY INITIAL EVALUATION (HCC): Status: ACTIVE | Noted: 2022-03-16

## 2022-03-16 LAB
ANION GAP SERPL CALCULATED.3IONS-SCNC: 14 MMOL/L (ref 7–16)
APTT: 34.9 SEC (ref 24.5–35.1)
BASOPHILS ABSOLUTE: 0.05 E9/L (ref 0–0.2)
BASOPHILS RELATIVE PERCENT: 0.6 % (ref 0–2)
BUN BLDV-MCNC: 7 MG/DL (ref 6–20)
CALCIUM SERPL-MCNC: 9.1 MG/DL (ref 8.6–10.2)
CHLORIDE BLD-SCNC: 101 MMOL/L (ref 98–107)
CO2: 18 MMOL/L (ref 22–29)
CREAT SERPL-MCNC: 0.4 MG/DL (ref 0.5–1)
D DIMER: 2087 NG/ML DDU
EOSINOPHILS ABSOLUTE: 0.03 E9/L (ref 0.05–0.5)
EOSINOPHILS RELATIVE PERCENT: 0.4 % (ref 0–6)
GFR AFRICAN AMERICAN: >60
GFR NON-AFRICAN AMERICAN: >60 ML/MIN/1.73
GLUCOSE BLD-MCNC: 105 MG/DL (ref 74–99)
HCG, URINE, POC: NEGATIVE
HCT VFR BLD CALC: 38.7 % (ref 34–48)
HEMOGLOBIN: 13.6 G/DL (ref 11.5–15.5)
IMMATURE GRANULOCYTES #: 0.02 E9/L
IMMATURE GRANULOCYTES %: 0.2 % (ref 0–5)
INR BLD: 1.1
LYMPHOCYTES ABSOLUTE: 1.18 E9/L (ref 1.5–4)
LYMPHOCYTES RELATIVE PERCENT: 13.8 % (ref 20–42)
Lab: NORMAL
MCH RBC QN AUTO: 32.4 PG (ref 26–35)
MCHC RBC AUTO-ENTMCNC: 35.1 % (ref 32–34.5)
MCV RBC AUTO: 92.1 FL (ref 80–99.9)
MONOCYTES ABSOLUTE: 0.45 E9/L (ref 0.1–0.95)
MONOCYTES RELATIVE PERCENT: 5.3 % (ref 2–12)
NEGATIVE QC PASS/FAIL: NORMAL
NEUTROPHILS ABSOLUTE: 6.83 E9/L (ref 1.8–7.3)
NEUTROPHILS RELATIVE PERCENT: 79.7 % (ref 43–80)
PDW BLD-RTO: 12.4 FL (ref 11.5–15)
PLATELET # BLD: 285 E9/L (ref 130–450)
PMV BLD AUTO: 9.7 FL (ref 7–12)
POSITIVE QC PASS/FAIL: NORMAL
POTASSIUM SERPL-SCNC: 3.5 MMOL/L (ref 3.5–5)
PROTHROMBIN TIME: 12.2 SEC (ref 9.3–12.4)
RBC # BLD: 4.2 E12/L (ref 3.5–5.5)
SODIUM BLD-SCNC: 133 MMOL/L (ref 132–146)
TROPONIN, HIGH SENSITIVITY: <6 NG/L (ref 0–9)
WBC # BLD: 8.6 E9/L (ref 4.5–11.5)

## 2022-03-16 PROCEDURE — 87150 DNA/RNA AMPLIFIED PROBE: CPT

## 2022-03-16 PROCEDURE — 6370000000 HC RX 637 (ALT 250 FOR IP): Performed by: INTERNAL MEDICINE

## 2022-03-16 PROCEDURE — 80048 BASIC METABOLIC PNL TOTAL CA: CPT

## 2022-03-16 PROCEDURE — 2580000003 HC RX 258: Performed by: INTERNAL MEDICINE

## 2022-03-16 PROCEDURE — 85025 COMPLETE CBC W/AUTO DIFF WBC: CPT

## 2022-03-16 PROCEDURE — 99284 EMERGENCY DEPT VISIT MOD MDM: CPT

## 2022-03-16 PROCEDURE — 2580000003 HC RX 258: Performed by: EMERGENCY MEDICINE

## 2022-03-16 PROCEDURE — 6360000002 HC RX W HCPCS: Performed by: INTERNAL MEDICINE

## 2022-03-16 PROCEDURE — 85730 THROMBOPLASTIN TIME PARTIAL: CPT

## 2022-03-16 PROCEDURE — 87077 CULTURE AEROBIC IDENTIFY: CPT

## 2022-03-16 PROCEDURE — 6370000000 HC RX 637 (ALT 250 FOR IP): Performed by: EMERGENCY MEDICINE

## 2022-03-16 PROCEDURE — 85378 FIBRIN DEGRADE SEMIQUANT: CPT

## 2022-03-16 PROCEDURE — 84484 ASSAY OF TROPONIN QUANT: CPT

## 2022-03-16 PROCEDURE — 96374 THER/PROPH/DIAG INJ IV PUSH: CPT

## 2022-03-16 PROCEDURE — 85610 PROTHROMBIN TIME: CPT

## 2022-03-16 PROCEDURE — 6360000004 HC RX CONTRAST MEDICATION: Performed by: RADIOLOGY

## 2022-03-16 PROCEDURE — 71275 CT ANGIOGRAPHY CHEST: CPT

## 2022-03-16 PROCEDURE — 6360000002 HC RX W HCPCS: Performed by: EMERGENCY MEDICINE

## 2022-03-16 PROCEDURE — 87040 BLOOD CULTURE FOR BACTERIA: CPT

## 2022-03-16 PROCEDURE — 87186 SC STD MICRODIL/AGAR DIL: CPT

## 2022-03-16 PROCEDURE — 93005 ELECTROCARDIOGRAM TRACING: CPT | Performed by: EMERGENCY MEDICINE

## 2022-03-16 PROCEDURE — 36415 COLL VENOUS BLD VENIPUNCTURE: CPT

## 2022-03-16 PROCEDURE — 71046 X-RAY EXAM CHEST 2 VIEWS: CPT

## 2022-03-16 PROCEDURE — 99222 1ST HOSP IP/OBS MODERATE 55: CPT | Performed by: INTERNAL MEDICINE

## 2022-03-16 PROCEDURE — 87449 NOS EACH ORGANISM AG IA: CPT

## 2022-03-16 PROCEDURE — 1200000000 HC SEMI PRIVATE

## 2022-03-16 RX ORDER — FOLIC ACID 1 MG/1
1 TABLET ORAL DAILY
Status: DISCONTINUED | OUTPATIENT
Start: 2022-03-16 | End: 2022-03-19 | Stop reason: HOSPADM

## 2022-03-16 RX ORDER — ACETAMINOPHEN 325 MG/1
650 TABLET ORAL EVERY 6 HOURS PRN
Status: DISCONTINUED | OUTPATIENT
Start: 2022-03-16 | End: 2022-03-19 | Stop reason: HOSPADM

## 2022-03-16 RX ORDER — SODIUM CHLORIDE 0.9 % (FLUSH) 0.9 %
5-40 SYRINGE (ML) INJECTION EVERY 12 HOURS SCHEDULED
Status: DISCONTINUED | OUTPATIENT
Start: 2022-03-16 | End: 2022-03-19 | Stop reason: HOSPADM

## 2022-03-16 RX ORDER — SODIUM CHLORIDE 9 MG/ML
25 INJECTION, SOLUTION INTRAVENOUS PRN
Status: DISCONTINUED | OUTPATIENT
Start: 2022-03-16 | End: 2022-03-19 | Stop reason: HOSPADM

## 2022-03-16 RX ORDER — BROMPHENIRAMINE MALEATE, PSEUDOEPHEDRINE HYDROCHLORIDE, AND DEXTROMETHORPHAN HYDROBROMIDE 2; 30; 10 MG/5ML; MG/5ML; MG/5ML
5 SYRUP ORAL 4 TIMES DAILY PRN
Status: DISCONTINUED | OUTPATIENT
Start: 2022-03-16 | End: 2022-03-19 | Stop reason: HOSPADM

## 2022-03-16 RX ORDER — 0.9 % SODIUM CHLORIDE 0.9 %
1000 INTRAVENOUS SOLUTION INTRAVENOUS ONCE
Status: COMPLETED | OUTPATIENT
Start: 2022-03-16 | End: 2022-03-16

## 2022-03-16 RX ORDER — SODIUM CHLORIDE 0.9 % (FLUSH) 0.9 %
5-40 SYRINGE (ML) INJECTION PRN
Status: DISCONTINUED | OUTPATIENT
Start: 2022-03-16 | End: 2022-03-19 | Stop reason: HOSPADM

## 2022-03-16 RX ORDER — KETOROLAC TROMETHAMINE 15 MG/ML
15 INJECTION, SOLUTION INTRAMUSCULAR; INTRAVENOUS EVERY 6 HOURS PRN
Status: DISCONTINUED | OUTPATIENT
Start: 2022-03-16 | End: 2022-03-19

## 2022-03-16 RX ORDER — KETOROLAC TROMETHAMINE 30 MG/ML
30 INJECTION, SOLUTION INTRAMUSCULAR; INTRAVENOUS ONCE
Status: COMPLETED | OUTPATIENT
Start: 2022-03-16 | End: 2022-03-16

## 2022-03-16 RX ORDER — ONDANSETRON 2 MG/ML
4 INJECTION INTRAMUSCULAR; INTRAVENOUS EVERY 6 HOURS PRN
Status: DISCONTINUED | OUTPATIENT
Start: 2022-03-16 | End: 2022-03-19 | Stop reason: HOSPADM

## 2022-03-16 RX ORDER — GAUZE BANDAGE 2" X 2"
100 BANDAGE TOPICAL DAILY
Status: DISCONTINUED | OUTPATIENT
Start: 2022-03-16 | End: 2022-03-19 | Stop reason: HOSPADM

## 2022-03-16 RX ORDER — ASCORBIC ACID 500 MG
500 TABLET ORAL 2 TIMES DAILY
Status: DISCONTINUED | OUTPATIENT
Start: 2022-03-16 | End: 2022-03-19 | Stop reason: HOSPADM

## 2022-03-16 RX ORDER — BUSPIRONE HYDROCHLORIDE 10 MG/1
20 TABLET ORAL 2 TIMES DAILY
Status: DISCONTINUED | OUTPATIENT
Start: 2022-03-16 | End: 2022-03-19 | Stop reason: HOSPADM

## 2022-03-16 RX ORDER — POLYETHYLENE GLYCOL 3350 17 G/17G
17 POWDER, FOR SOLUTION ORAL DAILY PRN
Status: DISCONTINUED | OUTPATIENT
Start: 2022-03-16 | End: 2022-03-19 | Stop reason: HOSPADM

## 2022-03-16 RX ORDER — ONDANSETRON 4 MG/1
4 TABLET, ORALLY DISINTEGRATING ORAL EVERY 8 HOURS PRN
Status: DISCONTINUED | OUTPATIENT
Start: 2022-03-16 | End: 2022-03-19 | Stop reason: HOSPADM

## 2022-03-16 RX ORDER — NICOTINE 21 MG/24HR
1 PATCH, TRANSDERMAL 24 HOURS TRANSDERMAL DAILY
Status: DISCONTINUED | OUTPATIENT
Start: 2022-03-16 | End: 2022-03-19 | Stop reason: HOSPADM

## 2022-03-16 RX ORDER — ACETAMINOPHEN 650 MG/1
650 SUPPOSITORY RECTAL EVERY 6 HOURS PRN
Status: DISCONTINUED | OUTPATIENT
Start: 2022-03-16 | End: 2022-03-19 | Stop reason: HOSPADM

## 2022-03-16 RX ORDER — SODIUM CHLORIDE, SODIUM LACTATE, POTASSIUM CHLORIDE, CALCIUM CHLORIDE 600; 310; 30; 20 MG/100ML; MG/100ML; MG/100ML; MG/100ML
INJECTION, SOLUTION INTRAVENOUS CONTINUOUS
Status: ACTIVE | OUTPATIENT
Start: 2022-03-16 | End: 2022-03-16

## 2022-03-16 RX ADMIN — FOLIC ACID 1 MG: 1 TABLET ORAL at 19:44

## 2022-03-16 RX ADMIN — DEXTROSE MONOHYDRATE 500 MG: 50 INJECTION, SOLUTION INTRAVENOUS at 17:00

## 2022-03-16 RX ADMIN — THIAMINE HCL TAB 100 MG 100 MG: 100 TAB at 19:45

## 2022-03-16 RX ADMIN — OXYCODONE HYDROCHLORIDE AND ACETAMINOPHEN 500 MG: 500 TABLET ORAL at 22:46

## 2022-03-16 RX ADMIN — SODIUM CHLORIDE 1000 ML: 9 INJECTION, SOLUTION INTRAVENOUS at 13:56

## 2022-03-16 RX ADMIN — SODIUM CHLORIDE, POTASSIUM CHLORIDE, SODIUM LACTATE AND CALCIUM CHLORIDE: 600; 310; 30; 20 INJECTION, SOLUTION INTRAVENOUS at 19:57

## 2022-03-16 RX ADMIN — KETOROLAC TROMETHAMINE 15 MG: 15 INJECTION, SOLUTION INTRAMUSCULAR; INTRAVENOUS at 18:02

## 2022-03-16 RX ADMIN — SODIUM CHLORIDE 1500 MG: 900 INJECTION INTRAVENOUS at 19:46

## 2022-03-16 RX ADMIN — IOPAMIDOL 75 ML: 755 INJECTION, SOLUTION INTRAVENOUS at 15:08

## 2022-03-16 RX ADMIN — BUSPIRONE HYDROCHLORIDE 20 MG: 10 TABLET ORAL at 22:46

## 2022-03-16 RX ADMIN — Medication 10 ML: at 22:48

## 2022-03-16 RX ADMIN — KETOROLAC TROMETHAMINE 15 MG: 15 INJECTION, SOLUTION INTRAMUSCULAR; INTRAVENOUS at 23:45

## 2022-03-16 RX ADMIN — KETOROLAC TROMETHAMINE 30 MG: 30 INJECTION, SOLUTION INTRAMUSCULAR; INTRAVENOUS at 13:56

## 2022-03-16 ASSESSMENT — PAIN SCALES - GENERAL
PAINLEVEL_OUTOF10: 6
PAINLEVEL_OUTOF10: 7
PAINLEVEL_OUTOF10: 6

## 2022-03-16 ASSESSMENT — ENCOUNTER SYMPTOMS
WHEEZING: 0
COUGH: 0
ABDOMINAL PAIN: 0
SORE THROAT: 0
SHORTNESS OF BREATH: 0
CHEST TIGHTNESS: 0
NAUSEA: 0
VOMITING: 0
DIARRHEA: 0
BACK PAIN: 0

## 2022-03-16 ASSESSMENT — PAIN - FUNCTIONAL ASSESSMENT: PAIN_FUNCTIONAL_ASSESSMENT: 0-10

## 2022-03-16 ASSESSMENT — PAIN DESCRIPTION - FREQUENCY
FREQUENCY: INTERMITTENT
FREQUENCY: CONTINUOUS
FREQUENCY: INTERMITTENT

## 2022-03-16 ASSESSMENT — PAIN DESCRIPTION - LOCATION
LOCATION: HEAD;CHEST
LOCATION: CHEST;BACK
LOCATION: CHEST;BACK
LOCATION: CHEST

## 2022-03-16 ASSESSMENT — PAIN DESCRIPTION - PAIN TYPE
TYPE: ACUTE PAIN

## 2022-03-16 ASSESSMENT — PAIN DESCRIPTION - ORIENTATION
ORIENTATION: LEFT

## 2022-03-16 ASSESSMENT — PAIN DESCRIPTION - DESCRIPTORS
DESCRIPTORS: DISCOMFORT
DESCRIPTORS: CONSTANT

## 2022-03-16 NOTE — CONSULTS
Consult Note            Date:3/16/2022        Patient Name:Stephanie BEARD Click     Date of DOBWJ:     Age:42 y.o. Consults    Chief Complaint     Chief Complaint   Patient presents with    Chest Pain     times one and a half weeks. Positive Covid .    Shortness of Breath     with exertion          History Obtained From   Patient    History of Present Illness   The patient is a 77-year-old female who has been smoking since age 18 and has some alcohol use as well. Over the last several weeks, she has become more anorexic and is lost perhaps 10 to 15 pounds. This has been accompanied by a cough and not regularly but certainly not infrequent night sweats. She has had Covid in the past, probably about 3 months ago but did not take any treatment for this. Noted test recently was negative. Chest x-rays and CAT scan shows an infiltrate/mass/possible cavitation in the left upper lobe. She is still smoking perhaps 1 to 2/day but for most of her life she smoked at least 1 pack/day. She works in retail sales. She denies any purulent sputum, recent dental problems, recent episodes of loss of consciousness, or coughing up blood. There has been no recent exposure to or at least known exposure to tuberculosis as far she knows. The patient has a remote history of heroin addiction but has been clean for a number of years. Past Medical History     Past Medical History:   Diagnosis Date    Abnormal brain MRI 2020    History of migraine 2020    Migraine     Migraine with aura and without status migrainosus, not intractable 2020    [facility] x 2 mos. , hx migraines    Substance abuse ([address]     Tobacco use disorder 2020        Past Surgical History     Past Surgical History:   Procedure Laterality Date     SECTION      x3    CYST REMOVAL          Medications     Prior to Admission medications    Medication Sig Start Date End Date Taking?  Authorizing Provider   ibuprofen (ADVIL;MOTRIN) 400 MG tablet Take 1 PO Q 6 - 8 hrs With Food/Meals for Pain/Fever. 1/1/22   Luis Chaves, DO   brompheniramine-pseudoephedrine-DM 2-30-10 MG/5ML syrup Take 5 mLs by mouth 4 times daily as needed for Congestion or Cough 1/1/22   Geovani Good, DO   ondansetron (ZOFRAN ODT) 4 MG disintegrating tablet Take 1 tablet by mouth every 8 hours as needed for Nausea or Vomiting 12/5/21 12/5/22  Adarsh Galarza DO   indomethacin (INDOCIN) 50 MG capsule Take 1 capsule by mouth 2 times daily (with meals) for 7 days 12/5/21 1/1/22  Adarsh Galarza DO   busPIRone HCl (BUSPAR PO) Take 20 mg by mouth 2 times daily    Historical Provider, MD        lactated ringers infusion, Continuous  nicotine (NICODERM CQ) 14 MG/24HR 1 patch, Daily  ketorolac (TORADOL) injection 15 mg, Q6H PRN  ampicillin-sulbactam (UNASYN) 1500 mg IVPB minibag, R5U  folic acid (FOLVITE) tablet 1 mg, Daily  thiamine mononitrate tablet 100 mg, Daily  ascorbic acid (VITAMIN C) tablet 500 mg, BID        Allergies   Imitrex [sumatriptan] and Naltrexone    Social History     Social History     Tobacco History     Smoking Status  Current Every Day Smoker Smoking Frequency  0.5 packs/day    Smokeless Tobacco Use  Never Used          Alcohol History     Alcohol Use Status  Not Currently          Drug Use     Drug Use Status  Not Currently Comment  heroin in past          Sexual Activity     Sexually Active  Yes                Family History     Family History   Problem Relation Age of Onset    Cerebral Aneurysm Mother        Review of Systems   Review of Systems   Positive for weight loss (10 to 15 pounds over the last month or 2), chest pain during inspiration in the left upper chest but denies other 10 system reviews.   Physical Exam   BP (!) 104/58   Pulse 64   Temp 98.3 °F (36.8 °C) (Oral)   Resp 20   Ht 5' 1\" (1.549 m)   Wt 112 lb (50.8 kg)   SpO2 98%   BMI 21.16 kg/m²      Physical Exam  Head eyes ears nose and throat unremarkable  Neck-no JVD, lymphadenopathy or thyromegaly  Skin-no recent or skin rashes or skin breakdown  Chest-clear to auscultation bilaterally except for few crackles in the anterior upper lung regions. No pleural rubs are present  Heart-S1, S2 normal.  No S3-S4 murmurs rubs present  Abdomen-soft, flat, nontender  Genitourinary-no CVA tenderness  Neurologic-awake, alert, oriented x3. No evidence of focal motor or sensory deficits. Psych-somewhat anxious affect but otherwise intact  Extremities-no clubbing, cyanosis, or edema  Labs    CBC:  Recent Labs     03/16/22  1346   WBC 8.6   RBC 4.20   HGB 13.6   HCT 38.7   MCV 92.1   RDW 12.4        CHEMISTRIES:  Recent Labs     03/16/22  1346      K 3.5      CO2 18*   BUN 7   CREATININE 0.4*   GLUCOSE 105*     PT/INR:  Recent Labs     03/16/22  1346   PROTIME 12.2   INR 1.1     APTT:  Recent Labs     03/16/22  1346   APTT 34.9     LIVER PROFILE:No results for input(s): AST, ALT, BILIDIR, BILITOT, ALKPHOS in the last 72 hours. Imaging/Diagnostics   XR CHEST (2 VW)    Result Date: 3/16/2022  New masslike opacity in peripheral left upper lobe related to focal pneumonia or neoplasm. CT chest recommended. CTA CHEST W CONTRAST    Result Date: 3/16/2022  1. Mass located in left upper lobe related to neoplasm or pneumonia. 2. Emphysematous changes. 3. No mediastinal lymphadenopathy. Assessment    1. Left upper lobe infiltrate/mass with possible cavitation abutting the pleural surface giving rise to inspiratory chest pain  2. COPD as evidenced by emphysema changes on CT and chest x-ray  3. Must rule out tuberculosis given the fact that the patient does have night sweats and this is a left upper lobe infiltrate  4. Weight loss, etiology to be determined  5.   Prior history of heroin use  Hospital Problems           Last Modified POA    * (Principal) Left upper lobe pneumonia 3/16/2022 Yes    Lung mass 3/16/2022 Yes    Pneumonia of left upper lobe due to infectious organism 3/16/2022 Yes    Heroin use disorder, severe, in sustained remission ([facility] [address] 3/16/2022 Yes    COPD suggested by initial evaluation ([facility] [address] 3/16/2022 Yes          Plan   1. IV fluids  2. Serologic TB test to be sent from emergency room  3. PPD  4. Treat for possible aspiration pneumonitis with ampicillin/sulbactam  5. Treat for community-acquired pneumonia in a cigarette smoker with a azithromycin  6. DuoNeb 3 times daily  7. Respiratory isolation until TB is ruled out  8. If no improvement is seen after several days of IV antibiotics, will need to consider outpatient PET/CT and percutaneous lung biopsy.     Electronically signed by yK Carrera MD on 3/16/22 at 6:01 PM EDT

## 2022-03-16 NOTE — ED PROVIDER NOTES
Chief complaint:  Chest pain    HPI history provided by the patient  Patient comes in complaining of left anterior upper chest pain going up to the trapezius into the shoulder a little bit, symptoms been present for about a week or 2. Pain really there with deep breathing or movement only. No migration or radiation of the pain and no other chest pain. No palpitations, no lightheadedness, no shortness of breath. No abdominal pain. No nausea vomiting or diarrhea. No fevers, sweats or chills or productive cough. No leg pain or swelling. No history of blood clots. Had COVID about 3 months ago. States that she took another test recently and was negative. No treatment prior to arrival.    Review of Systems   Constitutional: Negative for chills, diaphoresis, fatigue and fever. HENT: Negative for congestion and sore throat. Respiratory: Negative for cough, chest tightness, shortness of breath and wheezing. Cardiovascular: Positive for chest pain. Negative for palpitations and leg swelling. Gastrointestinal: Negative for abdominal pain, diarrhea, nausea and vomiting. Genitourinary: Negative for dysuria, flank pain, frequency and urgency. Musculoskeletal: Negative for arthralgias, back pain, gait problem, joint swelling, myalgias, neck pain and neck stiffness. Skin: Negative for rash and wound. Neurological: Negative for dizziness, seizures, syncope, weakness, light-headedness, numbness and headaches. All other systems reviewed and are negative. Physical Exam  Vitals and nursing note reviewed. Constitutional:       General: She is not in acute distress. Appearance: She is well-developed. She is not ill-appearing, toxic-appearing or diaphoretic. HENT:      Head: Normocephalic and atraumatic. Eyes:      General: Lids are normal. No scleral icterus. Extraocular Movements: Extraocular movements intact.       Conjunctiva/sclera: Conjunctivae normal.      Pupils: Pupils are equal, round, and reactive to light. Neck:      Trachea: Trachea normal. No tracheal tenderness. Cardiovascular:      Rate and Rhythm: Normal rate and regular rhythm. Heart sounds: Normal heart sounds. No murmur heard. Pulmonary:      Effort: Pulmonary effort is normal. No tachypnea, accessory muscle usage or respiratory distress. Breath sounds: Normal breath sounds. No stridor, decreased air movement or transmitted upper airway sounds. No decreased breath sounds, wheezing, rhonchi or rales. Chest:      Chest wall: No tenderness. Abdominal:      General: Bowel sounds are normal. There is no distension. Palpations: Abdomen is soft. Tenderness: There is no abdominal tenderness. There is no right CVA tenderness, left CVA tenderness, guarding or rebound. Musculoskeletal:         General: No swelling, tenderness, deformity or signs of injury. Normal range of motion. Cervical back: Normal range of motion and neck supple. No signs of trauma or rigidity. No pain with movement, spinous process tenderness or muscular tenderness. Normal range of motion. Right lower leg: No tenderness. No edema. Left lower leg: No tenderness. No edema. Comments: No pretibial edema or calf pain bilaterally   Lymphadenopathy:      Cervical: No cervical adenopathy. Skin:     General: Skin is warm and dry. Coloration: Skin is not cyanotic, jaundiced or pale. Findings: No erythema or rash. Nails: There is no clubbing. Neurological:      General: No focal deficit present. Mental Status: She is alert and oriented to person, place, and time. GCS: GCS eye subscore is 4. GCS verbal subscore is 5. GCS motor subscore is 6. Cranial Nerves: Cranial nerves are intact. No cranial nerve deficit. Sensory: Sensation is intact. Motor: Motor function is intact. Coordination: Coordination is intact.  Coordination normal.          Procedures     MDM     ED Course as of 03/16/22 1657   Wed Mar 16, 2022   1519 Patient sitting up in the bed resting comfortably no acute distress. Updated on x-ray results and laboratory results at this time, CT results still pending. Exam is unchanged. [NC]   1931 Case discussed with Dr. Hilton Brewer for pulmonology consult, detailed overview given, case reviewed, CT reviewed, he does recommend starting patient on Unasyn and Zithromax and admitting the patient to the hospital and they will further evaluate and treat the patient. [NC]   4187 Patient resting comfortably no distress. Further questioning, patient has been sober with no alcohol consumption for several years. Continues to smoke. Has had no recent dental work done. [NC]   5351 Case discussed with Dr. Clotilde Magallon, detailed over given, he will admit the patient. [NC]      ED Course User Index  [NC] Lori Carbajal DO     EKG Interpretation    Interpreted by emergency department physician    Rhythm: normal sinus   Rate: 71  Axis: normal  Ectopy: none  Conduction: right bundle branch block (incomplete)  ST Segments: normal  T Waves: normal  Q Waves: none    Clinical Impression: no acute changes    Lori Carbajal DO     ED Course as of 03/16/22 1657   Wed Mar 16, 2022   1519 Patient sitting up in the bed resting comfortably no acute distress. Updated on x-ray results and laboratory results at this time, CT results still pending. Exam is unchanged. [NC]   9139 Case discussed with Dr. Hilton Brewer for pulmonology consult, detailed overview given, case reviewed, CT reviewed, he does recommend starting patient on Unasyn and Zithromax and admitting the patient to the hospital and they will further evaluate and treat the patient. [NC]   2172 Patient resting comfortably no distress. Further questioning, patient has been sober with no alcohol consumption for several years. Continues to smoke. Has had no recent dental work done.  [NC]   7650 Case discussed with Dr. Clotilde Magallon, detailed over given, he will admit the patient. [NC]      ED Course User Index  [NC] Azam Odom, DO       --------------------------------------------- PAST HISTORY ---------------------------------------------  Past Medical History:  has a past medical history of Abnormal brain MRI, History of migraine, Migraine, Migraine with aura and without status migrainosus, not intractable, Substance abuse ([address] and Tobacco use disorder. Past Surgical History:  has a past surgical history that includes  section and cyst removal.    Social History:  reports that she has quit smoking. She smoked 0.50 packs per day. She has never used smokeless tobacco. She reports previous alcohol use. She reports previous drug use. Family History: family history is not on file. The patients home medications have been reviewed.     Allergies: Imitrex [sumatriptan] and Naltrexone    -------------------------------------------------- RESULTS -------------------------------------------------    LABS:  Results for orders placed or performed during the hospital encounter of 22   CBC with Auto Differential   Result Value Ref Range    WBC 8.6 4.5 - 11.5 E9/L    RBC 4.20 3.50 - 5.50 E12/L    Hemoglobin 13.6 11.5 - 15.5 g/dL    Hematocrit 38.7 34.0 - 48.0 %    MCV 92.1 80.0 - 99.9 fL    MCH 32.4 26.0 - 35.0 pg    MCHC 35.1 (H) 32.0 - 34.5 %    RDW 12.4 11.5 - 15.0 fL    Platelets 294 088 - 594 E9/L    MPV 9.7 7.0 - 12.0 fL    Neutrophils % 79.7 43.0 - 80.0 %    Immature Granulocytes % 0.2 0.0 - 5.0 %    Lymphocytes % 13.8 (L) 20.0 - 42.0 %    Monocytes % 5.3 2.0 - 12.0 %    Eosinophils % 0.4 0.0 - 6.0 %    Basophils % 0.6 0.0 - 2.0 %    Neutrophils Absolute 6.83 1.80 - 7.30 E9/L    Immature Granulocytes # 0.02 E9/L    Lymphocytes Absolute 1.18 (L) 1.50 - 4.00 E9/L    Monocytes Absolute 0.45 0.10 - 0.95 E9/L    Eosinophils Absolute 0.03 (L) 0.05 - 0.50 E9/L    Basophils Absolute 0.05 0.00 - 0.20 B3/Q   Basic Metabolic Panel   Result Value Ref Range    Sodium 133 132 - 146 mmol/L    Potassium 3.5 3.5 - 5.0 mmol/L    Chloride 101 98 - 107 mmol/L    CO2 18 (L) 22 - 29 mmol/L    Anion Gap 14 7 - 16 mmol/L    Glucose 105 (H) 74 - 99 mg/dL    BUN 7 6 - 20 mg/dL    CREATININE 0.4 (L) 0.5 - 1.0 mg/dL    GFR Non-African American >60 >=60 mL/min/1.73    GFR African American >60     Calcium 9.1 8.6 - 10.2 mg/dL   Protime-INR   Result Value Ref Range    Protime 12.2 9.3 - 12.4 sec    INR 1.1    APTT   Result Value Ref Range    aPTT 34.9 24.5 - 35.1 sec   D-Dimer, Quantitative   Result Value Ref Range    D-Dimer, Quant 2087 ng/mL DDU   Troponin   Result Value Ref Range    Troponin, High Sensitivity <6 0 - 9 ng/L   POC Pregnancy Urine Qual   Result Value Ref Range    HCG, Urine, POC Negative Negative    Lot Number [id number]     Positive QC Pass/Fail Pass     Negative QC Pass/Fail Pass    EKG 12 Lead   Result Value Ref Range    Ventricular Rate 71 BPM    Atrial Rate 71 BPM    P-R Interval 182 ms    QRS Duration 94 ms    Q-T Interval 378 ms    QTc Calculation (Bazett) 410 ms    P Axis 61 degrees    R Axis 53 degrees    T Axis 54 degrees       RADIOLOGY:  CTA CHEST W CONTRAST   Final Result   1. Mass located in left upper lobe related to neoplasm or pneumonia. 2. Emphysematous changes. 3. No mediastinal lymphadenopathy. XR CHEST (2 VW)   Final Result   New masslike opacity in peripheral left upper lobe related to focal pneumonia   or neoplasm. CT chest recommended. ------------------------- NURSING NOTES AND VITALS REVIEWED ---------------------------  Date / Time Roomed:  3/16/2022  1:10 PM  ED Bed Assignment:  [id number]    The nursing notes within the ED encounter and vital signs as below have been reviewed.      Patient Vitals for the past 24 hrs:   BP Temp Temp src Pulse Resp SpO2 Height Weight   03/16/22 1611 (!) 104/58 -- -- 64 20 98 % -- --   03/16/22 1306 (!) 107/59 98.3 °F (36.8 °C) Oral -- 18 98 % 5' 1\" (1.549 m) 112 lb (50.8 kg) 03/16/22 1305 -- -- -- 100 -- 98 % -- --       Oxygen Saturation Interpretation: Normal        Counseling:  I have spoken with the patient and discussed todays results, in addition to providing specific details for the plan of care and counseling regarding the diagnosis and prognosis. Their questions are answered at this time and they are agreeable with the plan of admission.    --------------------------------- ADDITIONAL PROVIDER NOTES ---------------------------------  Consultations: This patient's ED course included: a personal history and physicial examination, re-evaluation prior to disposition, multiple bedside re-evaluations and IV medications    This patient has remained hemodynamically stable during their ED course. Diagnosis:  1. Lung mass    2. Pneumonia of left upper lobe due to infectious organism        Disposition:  Patient's disposition: Admit to med/surg floor  Patient's condition is stable.          [address] [facility], DO  03/16/22 1658

## 2022-03-16 NOTE — H&P
[address] [place], [place] [address]ist Group   History and Physical      CHIEF COMPLAINT: Cough and chest pain    History of Present Illness:  38 y.o. female with a history of heroin abuse in long-term remission, migraine headaches, tobacco use presents with cough and chest pain. She had Covid about 3 months ago. Over the last 2 weeks, she has had pain on the left side of her chest with deep inspiration. No palliating factors. Denies any fever, chills, or sick contacts. She took an at-home Covid test this morning which was negative. One episode of diarrhea this morning, but without recurrence. BMP only remarkable for mild metabolic acidosis with bicarbonate of 18. CBC unremarkable. CTA of the chest was done which did not show pulmonary embolism, but does show a left upper lobe dense area concerning for pneumonia versus neoplasm. She is started on Unasyn and azithromycin. ED physician discussed case with pulmonology who recommended admission, as this potential pneumonia mass will need further inpatient evaluation. In the ED, vital signs are within normal limits. Informant(s) for H&P: Patient, ED physician, chart review    REVIEW OF SYSTEMS:  no fevers, chills, cp, sob, n/v, ha, vision/hearing changes, wt changes, hot/cold flashes, other open skin lesions, diarrhea, constipation, dysuria/hematuria unless noted in HPI. Complete ROS performed with the patient and is otherwise negative. PMH:  Past Medical History:   Diagnosis Date    Abnormal brain MRI 2020    History of migraine 2020    Migraine     Migraine with aura and without status migrainosus, not intractable 2020    [facility] x 2 mos. , hx migraines    Substance abuse ([facility] [address]     Tobacco use disorder 2020       Surgical History:  Past Surgical History:   Procedure Laterality Date     SECTION      x3    CYST REMOVAL         Medications Prior to Admission:    Prior to Admission medications    Medication Sig Start Date End Date Taking? Authorizing Provider   ibuprofen (ADVIL;MOTRIN) 400 MG tablet Take 1 PO Q 6 - 8 hrs With Food/Meals for Pain/Fever. 1/1/22   Luis Chaves, DO   brompheniramine-pseudoephedrine-DM 2-30-10 MG/5ML syrup Take 5 mLs by mouth 4 times daily as needed for Congestion or Cough 1/1/22   Renata Murphy, DO   ondansetron (ZOFRAN ODT) 4 MG disintegrating tablet Take 1 tablet by mouth every 8 hours as needed for Nausea or Vomiting 12/5/21 12/5/22  Era Galarza, DO   indomethacin (INDOCIN) 50 MG capsule Take 1 capsule by mouth 2 times daily (with meals) for 7 days 12/5/21 1/1/22  Era Galarza, DO   busPIRone HCl (BUSPAR PO) Take 20 mg by mouth 2 times daily    Historical Provider, MD       Allergies:    Imitrex [sumatriptan] and Naltrexone    Social History:    reports that she has been smoking. She has been smoking about 0.50 packs per day. She has never used smokeless tobacco. She reports previous alcohol use. She reports previous drug use. Family History:   family history includes Cerebral Aneurysm in her mother. PHYSICAL EXAM:  Vitals:  BP (!) 104/58   Pulse 64   Temp 98.3 °F (36.8 °C) (Oral)   Resp 20   Ht 5' 1\" (1.549 m)   Wt 112 lb (50.8 kg)   SpO2 98%   BMI 21.16 kg/m²     General Appearance: alert and oriented to person, place and time. Somewhat anxious but not in fadia distress  Skin: warm and dry  Head: normocephalic and atraumatic  Eyes: pupils equal, round, and reactive to light, extraocular eye movements intact, conjunctivae normal  Neck: neck supple and non tender without mass   Pulmonary/Chest: Nonlabored on room air. Diminished but otherwise clear to auscultation bilaterally.   Cardiovascular: normal rate, normal S1 and S2 and no carotid bruits  Abdomen: soft, non-tender, non-distended, normal bowel sounds, no masses or organomegaly  Extremities: no cyanosis, no clubbing and no edema  Neurologic: no cranial nerve deficit and speech normal    LABS:  Recent Labs 03/16/22  1346      K 3.5      CO2 18*   BUN 7   CREATININE 0.4*   GLUCOSE 105*   CALCIUM 9.1       Recent Labs     03/16/22  1346   WBC 8.6   RBC 4.20   HGB 13.6   HCT 38.7   MCV 92.1   MCH 32.4   MCHC 35.1*   RDW 12.4      MPV 9.7       No results for input(s): POCGLU in the last 72 hours. Radiology: XR CHEST (2 VW)    Result Date: 3/16/2022  EXAMINATION: TWO XRAY VIEWS OF THE CHEST 3/16/2022 2:31 pm COMPARISON: April 19, 2015 HISTORY: ORDERING SYSTEM PROVIDED HISTORY: right chest pain TECHNOLOGIST PROVIDED HISTORY: Reason for exam:->right chest pain FINDINGS: Masslike opacity located in peripheral left upper lobe measures 4.3 x 4.3 cm. No pneumothorax. No pleural effusion. New masslike opacity in peripheral left upper lobe related to focal pneumonia or neoplasm. CT chest recommended. CTA CHEST W CONTRAST    Result Date: 3/16/2022  EXAMINATION: CTA OF THE CHEST 3/16/2022 2:57 pm TECHNIQUE: CTA of the chest was performed after the administration of intravenous contrast.  Multiplanar reformatted images are provided for review. MIP images are provided for review. Dose modulation, iterative reconstruction, and/or weight based adjustment of the mA/kV was utilized to reduce the radiation dose to as low as reasonably achievable. COMPARISON: None. HISTORY: ORDERING SYSTEM PROVIDED HISTORY: rule out PE, left sided chest pain TECHNOLOGIST PROVIDED HISTORY: Reason for exam:->rule out PE, left sided chest pain Decision Support Exception - unselect if not a suspected or confirmed emergency medical condition->Emergency Medical Condition (MA) FINDINGS: Mass with irregular margins and adjacent ground-glass opacities located in peripheral aspect of the left upper lobe. Small foci of gas are seen within the medial margin of the lesion. This lesion measures 3.4 x 3.7 cm. No adjacent bony erosive changes. No additional mass present. No suspicious pulmonary nodule.   Hyperinflation of airspaces throughout both lungs notable in upper lobes. Blebs and bulla are also present in the upper lobes. No pneumothorax. The heart is normal in size. No pericardial effusion. View of the upper abdomen shows grossly normal bilateral adrenal glands. 1. Mass located in left upper lobe related to neoplasm or pneumonia. 2. Emphysematous changes. 3. No mediastinal lymphadenopathy. EKG: Normal sinus rhythm with incomplete right bundle branch block    ASSESSMENT AND PLAN:      Principal Problem:    Left upper lobe pneumonia  Active Problems:    Lung mass    Pneumonia of left upper lobe due to infectious organism    Heroin use disorder, severe, in sustained remission ([address]    COPD suggested by initial evaluation ([facility] [address]  Resolved Problems:    * No resolved hospital problems. *      1. Left upper lobe pneumonia in the setting of prior COVID-19 infection  -Continue Unasyn and azithromycin  -Obtain sputum culture, check strep pneumo and Legionella urine antigens    2. Possible left upper lobe mass  -Given appearance on CT, concerning for mass versus pneumonia  -Pulmonary consult requested; ED physician spoke to Dr. Army Palomares    3. Heroin use disorder in sustained remission  -in recovery for 3 years  -No narcotic pain medications per patient    4. Tobacco abuse with CT findings consistent with COPD  -Counseled on cessation  -Nicotine patch  -Not requiring supplemental oxygen at this time    5. Anxiety  -Continue buspirone    Code Status: Full code  DVT prophylaxis: Enoxaparin    Disposition: Admit to medical floor. Anticipate 2 to 3 days in the hospital    NOTE: This report was transcribed using voice recognition software.  Every effort was made to ensure accuracy; however, inadvertent computerized transcription errors may be present.     Electronically signed by America Ortega DO on 3/16/2022 at 5:29 PM

## 2022-03-17 LAB
ALBUMIN SERPL-MCNC: 3.4 G/DL (ref 3.5–5.2)
ALP BLD-CCNC: 58 U/L (ref 35–104)
ALT SERPL-CCNC: 37 U/L (ref 0–32)
ANION GAP SERPL CALCULATED.3IONS-SCNC: 14 MMOL/L (ref 7–16)
AST SERPL-CCNC: 35 U/L (ref 0–31)
BASOPHILS ABSOLUTE: 0.05 E9/L (ref 0–0.2)
BASOPHILS RELATIVE PERCENT: 0.8 % (ref 0–2)
BILIRUB SERPL-MCNC: 0.6 MG/DL (ref 0–1.2)
BUN BLDV-MCNC: 5 MG/DL (ref 6–20)
CALCIUM SERPL-MCNC: 8.7 MG/DL (ref 8.6–10.2)
CHLORIDE BLD-SCNC: 106 MMOL/L (ref 98–107)
CO2: 16 MMOL/L (ref 22–29)
CREAT SERPL-MCNC: 0.4 MG/DL (ref 0.5–1)
EOSINOPHILS ABSOLUTE: 0.12 E9/L (ref 0.05–0.5)
EOSINOPHILS RELATIVE PERCENT: 1.9 % (ref 0–6)
GFR AFRICAN AMERICAN: >60
GFR NON-AFRICAN AMERICAN: >60 ML/MIN/1.73
GLUCOSE BLD-MCNC: 85 MG/DL (ref 74–99)
HCT VFR BLD CALC: 34.7 % (ref 34–48)
HEMOGLOBIN: 12.2 G/DL (ref 11.5–15.5)
IMMATURE GRANULOCYTES #: 0.02 E9/L
IMMATURE GRANULOCYTES %: 0.3 % (ref 0–5)
L. PNEUMOPHILA SEROGP 1 UR AG: NORMAL
LYMPHOCYTES ABSOLUTE: 1.69 E9/L (ref 1.5–4)
LYMPHOCYTES RELATIVE PERCENT: 26.2 % (ref 20–42)
MAGNESIUM: 2.4 MG/DL (ref 1.6–2.6)
MCH RBC QN AUTO: 32.4 PG (ref 26–35)
MCHC RBC AUTO-ENTMCNC: 35.2 % (ref 32–34.5)
MCV RBC AUTO: 92 FL (ref 80–99.9)
MONOCYTES ABSOLUTE: 0.49 E9/L (ref 0.1–0.95)
MONOCYTES RELATIVE PERCENT: 7.6 % (ref 2–12)
NEUTROPHILS ABSOLUTE: 4.09 E9/L (ref 1.8–7.3)
NEUTROPHILS RELATIVE PERCENT: 63.2 % (ref 43–80)
PDW BLD-RTO: 12.3 FL (ref 11.5–15)
PHOSPHORUS: 3.1 MG/DL (ref 2.5–4.5)
PLATELET # BLD: 282 E9/L (ref 130–450)
PMV BLD AUTO: 9.6 FL (ref 7–12)
POTASSIUM SERPL-SCNC: 3.8 MMOL/L (ref 3.5–5)
RBC # BLD: 3.77 E12/L (ref 3.5–5.5)
SODIUM BLD-SCNC: 136 MMOL/L (ref 132–146)
STREP PNEUMONIAE ANTIGEN, URINE: NORMAL
TOTAL PROTEIN: 7.2 G/DL (ref 6.4–8.3)
WBC # BLD: 6.5 E9/L (ref 4.5–11.5)

## 2022-03-17 PROCEDURE — 6370000000 HC RX 637 (ALT 250 FOR IP): Performed by: EMERGENCY MEDICINE

## 2022-03-17 PROCEDURE — 6370000000 HC RX 637 (ALT 250 FOR IP): Performed by: INTERNAL MEDICINE

## 2022-03-17 PROCEDURE — 86481 TB AG RESPONSE T-CELL SUSP: CPT

## 2022-03-17 PROCEDURE — 36415 COLL VENOUS BLD VENIPUNCTURE: CPT

## 2022-03-17 PROCEDURE — 85025 COMPLETE CBC W/AUTO DIFF WBC: CPT

## 2022-03-17 PROCEDURE — 1200000000 HC SEMI PRIVATE

## 2022-03-17 PROCEDURE — 99232 SBSQ HOSP IP/OBS MODERATE 35: CPT | Performed by: INTERNAL MEDICINE

## 2022-03-17 PROCEDURE — 84100 ASSAY OF PHOSPHORUS: CPT

## 2022-03-17 PROCEDURE — 2580000003 HC RX 258: Performed by: INTERNAL MEDICINE

## 2022-03-17 PROCEDURE — 80053 COMPREHEN METABOLIC PANEL: CPT

## 2022-03-17 PROCEDURE — 6360000002 HC RX W HCPCS: Performed by: INTERNAL MEDICINE

## 2022-03-17 PROCEDURE — 83735 ASSAY OF MAGNESIUM: CPT

## 2022-03-17 RX ADMIN — KETOROLAC TROMETHAMINE 15 MG: 15 INJECTION, SOLUTION INTRAMUSCULAR; INTRAVENOUS at 18:29

## 2022-03-17 RX ADMIN — SODIUM CHLORIDE 3000 MG: 900 INJECTION INTRAVENOUS at 06:48

## 2022-03-17 RX ADMIN — KETOROLAC TROMETHAMINE 15 MG: 15 INJECTION, SOLUTION INTRAMUSCULAR; INTRAVENOUS at 06:54

## 2022-03-17 RX ADMIN — Medication 10 ML: at 22:17

## 2022-03-17 RX ADMIN — BUSPIRONE HYDROCHLORIDE 20 MG: 10 TABLET ORAL at 22:16

## 2022-03-17 RX ADMIN — OXYCODONE HYDROCHLORIDE AND ACETAMINOPHEN 500 MG: 500 TABLET ORAL at 22:16

## 2022-03-17 RX ADMIN — AZITHROMYCIN MONOHYDRATE 500 MG: 500 INJECTION, POWDER, LYOPHILIZED, FOR SOLUTION INTRAVENOUS at 17:48

## 2022-03-17 RX ADMIN — SODIUM CHLORIDE 3000 MG: 900 INJECTION INTRAVENOUS at 00:54

## 2022-03-17 RX ADMIN — BUSPIRONE HYDROCHLORIDE 20 MG: 10 TABLET ORAL at 08:29

## 2022-03-17 RX ADMIN — SODIUM CHLORIDE 3000 MG: 900 INJECTION INTRAVENOUS at 13:12

## 2022-03-17 RX ADMIN — SODIUM CHLORIDE 3000 MG: 900 INJECTION INTRAVENOUS at 19:43

## 2022-03-17 ASSESSMENT — PAIN SCALES - GENERAL
PAINLEVEL_OUTOF10: 5
PAINLEVEL_OUTOF10: 6
PAINLEVEL_OUTOF10: 4
PAINLEVEL_OUTOF10: 4
PAINLEVEL_OUTOF10: 5

## 2022-03-17 ASSESSMENT — PAIN DESCRIPTION - LOCATION
LOCATION: BACK;CHEST
LOCATION: BACK;CHEST
LOCATION: CHEST;BACK

## 2022-03-17 ASSESSMENT — PAIN DESCRIPTION - DESCRIPTORS
DESCRIPTORS: DISCOMFORT
DESCRIPTORS: DISCOMFORT

## 2022-03-17 ASSESSMENT — PAIN DESCRIPTION - PAIN TYPE
TYPE: ACUTE PAIN

## 2022-03-17 ASSESSMENT — PAIN DESCRIPTION - FREQUENCY: FREQUENCY: INTERMITTENT

## 2022-03-17 NOTE — PROGRESS NOTES
Pulmonary/Critical Care Progress Note    We are following patient for left upper lobe pneumonia/mass/nodule, COPD, chest pain secondary to location of lesion abutting left upper lobe pleural surface    SUBJECTIVE:  Patient definitely appears better and less uncomfortable today. She is still having pain but in general, her affect is brighter and her general appearance seems less \"toxic. \". We will continue azithromycin and Unasyn for several days. Chest x-ray will be done tomorrow. She will need a follow-up CT after discharge. MEDICATIONS:   sodium chloride flush  5-40 mL IntraVENous 2 times per day    enoxaparin  40 mg SubCUTAneous Daily    ampicillin-sulbactam  3,000 mg IntraVENous Q6H    azithromycin  500 mg IntraVENous Q24H    nicotine  1 patch TransDERmal Daily    busPIRone  20 mg Oral BID    folic acid  1 mg Oral Daily    thiamine mononitrate  100 mg Oral Daily    ascorbic acid  500 mg Oral BID      sodium chloride       brompheniramine-pseudoephedrine-DM, sodium chloride flush, sodium chloride, ondansetron **OR** ondansetron, polyethylene glycol, acetaminophen **OR** acetaminophen, ketorolac      REVIEW OF SYSTEMS:  Constitutional: Denies fever, weight loss, night sweats, and fatigue  Skin: Denies pigmentation, dark lesions, and rashes   HEENT: Denies hearing loss, tinnitus, ear drainage, epistaxis, sore throat, and hoarseness. Cardiovascular: Denies palpitations, positive for chest pain left upper lung area, but negative for chest pressure. Respiratory: Denies cough, dyspnea at rest, hemoptysis, apnea, and choking.   Gastrointestinal: Denies nausea, vomiting, poor appetite, diarrhea, heartburn or reflux  Genitourinary: Denies dysuria, frequency, urgency or hematuria  Musculoskeletal: Denies myalgias, muscle weakness, and bone pain  Neurological: Denies dizziness, vertigo, headache, and focal weakness  Psychological: Denies anxiety and depression  Endocrine: Denies heat intolerance and cold intolerance  Hematopoietic/Lymphatic: Denies bleeding problems and blood transfusions    OBJECTIVE:  Vitals:    03/17/22 1300   BP: (!) 100/56   Pulse: 72   Resp: 18   Temp: 97.9 °F (36.6 °C)   SpO2: 97%           O2 Device: None (Room air)    PHYSICAL EXAM:  Constitutional: No fever, chills, diaphoresis  Skin: No skin rash, no skin breakdown  HEENT: Unremarkable  Neck: No JVD, lymphadenopathy, thyromegaly  Cardiovascular: S1, S2 regular. No S3 murmurs rubs present  Respiratory: Clear to auscultation bilaterally. No crackles or wheezes heard  Gastrointestinal: Soft, flat, nontender  Genitourinary: No CVA tenderness  Extremities: No clubbing, cyanosis, or edema  Neurological: Awake, alert, oriented x3. No evidence of focal motor or sensory deficits  Psychological: Appropriate affect.   Spirits have improved    LABS:  WBC   Date Value Ref Range Status   03/17/2022 6.5 4.5 - 11.5 E9/L Final   03/16/2022 8.6 4.5 - 11.5 E9/L Final   05/13/2021 6.3 4.5 - 11.5 E9/L Final     Hemoglobin   Date Value Ref Range Status   03/17/2022 12.2 11.5 - 15.5 g/dL Final   03/16/2022 13.6 11.5 - 15.5 g/dL Final   05/13/2021 14.2 11.5 - 15.5 g/dL Final     Hematocrit   Date Value Ref Range Status   03/17/2022 34.7 34.0 - 48.0 % Final   03/16/2022 38.7 34.0 - 48.0 % Final   05/13/2021 40.4 34.0 - 48.0 % Final     MCV   Date Value Ref Range Status   03/17/2022 92.0 80.0 - 99.9 fL Final   03/16/2022 92.1 80.0 - 99.9 fL Final   05/13/2021 91.8 80.0 - 99.9 fL Final     Platelets   Date Value Ref Range Status   03/17/2022 282 130 - 450 E9/L Final   03/16/2022 285 130 - 450 E9/L Final   05/13/2021 247 130 - 450 E9/L Final     Sodium   Date Value Ref Range Status   03/17/2022 136 132 - 146 mmol/L Final   03/16/2022 133 132 - 146 mmol/L Final   05/13/2021 137 132 - 146 mmol/L Final     Potassium   Date Value Ref Range Status   03/17/2022 3.8 3.5 - 5.0 mmol/L Final   03/16/2022 3.5 3.5 - 5.0 mmol/L Final   05/13/2021 4.1 3.5 - 5.0 mmol/L Final Chloride   Date Value Ref Range Status   03/17/2022 106 98 - 107 mmol/L Final   03/16/2022 101 98 - 107 mmol/L Final   05/13/2021 101 98 - 107 mmol/L Final     CO2   Date Value Ref Range Status   03/17/2022 16 (L) 22 - 29 mmol/L Final   03/16/2022 18 (L) 22 - 29 mmol/L Final   05/13/2021 27 22 - 29 mmol/L Final     BUN   Date Value Ref Range Status   03/17/2022 5 (L) 6 - 20 mg/dL Final   03/16/2022 7 6 - 20 mg/dL Final   05/13/2021 8 6 - 20 mg/dL Final     CREATININE   Date Value Ref Range Status   03/17/2022 0.4 (L) 0.5 - 1.0 mg/dL Final   03/16/2022 0.4 (L) 0.5 - 1.0 mg/dL Final   05/13/2021 0.6 0.5 - 1.0 mg/dL Final     Glucose   Date Value Ref Range Status   03/17/2022 85 74 - 99 mg/dL Final   03/16/2022 105 (H) 74 - 99 mg/dL Final   05/13/2021 79 74 - 99 mg/dL Final     Calcium   Date Value Ref Range Status   03/17/2022 8.7 8.6 - 10.2 mg/dL Final   03/16/2022 9.1 8.6 - 10.2 mg/dL Final   05/13/2021 9.5 8.6 - 10.2 mg/dL Final     Total Protein   Date Value Ref Range Status   03/17/2022 7.2 6.4 - 8.3 g/dL Final   05/13/2021 8.1 6.4 - 8.3 g/dL Final   02/11/2020 8.7 (H) 6.4 - 8.3 g/dL Final     Albumin   Date Value Ref Range Status   03/17/2022 3.4 (L) 3.5 - 5.2 g/dL Final   05/13/2021 4.6 3.5 - 5.2 g/dL Final   02/11/2020 4.7 3.5 - 5.2 g/dL Final     Total Bilirubin   Date Value Ref Range Status   03/17/2022 0.6 0.0 - 1.2 mg/dL Final   05/13/2021 0.7 0.0 - 1.2 mg/dL Final   02/11/2020 0.5 0.0 - 1.2 mg/dL Final     Alkaline Phosphatase   Date Value Ref Range Status   03/17/2022 58 35 - 104 U/L Final   05/13/2021 66 35 - 104 U/L Final   02/11/2020 66 35 - 104 U/L Final     AST   Date Value Ref Range Status   03/17/2022 35 (H) 0 - 31 U/L Final     Comment:     Specimen is slightly Hemolyzed. Result may be artificially increased.    05/13/2021 34 (H) 0 - 31 U/L Final   02/11/2020 31 0 - 31 U/L Final     ALT   Date Value Ref Range Status   03/17/2022 37 (H) 0 - 32 U/L Final   05/13/2021 42 (H) 0 - 32 U/L Final 02/11/2020 46 (H) 0 - 32 U/L Final     GFR Non-   Date Value Ref Range Status   03/17/2022 >60 >=60 mL/min/1.73 Final     Comment:     Chronic Kidney Disease: less than 60 ml/min/1.73 sq.m. Kidney Failure: less than 15 ml/min/1.73 sq.m. Results valid for patients 18 years and older. 03/16/2022 >60 >=60 mL/min/1.73 Final     Comment:     Chronic Kidney Disease: less than 60 ml/min/1.73 sq.m. Kidney Failure: less than 15 ml/min/1.73 sq.m. Results valid for patients 18 years and older. 05/13/2021 >60 >=60 mL/min/1.73 Final     Comment:     Chronic Kidney Disease: less than 60 ml/min/1.73 sq.m. Kidney Failure: less than 15 ml/min/1.73 sq.m. Results valid for patients 18 years and older. GFR    Date Value Ref Range Status   03/17/2022 >60  Final   03/16/2022 >60  Final   05/13/2021 >60  Final     Magnesium   Date Value Ref Range Status   03/17/2022 2.4 1.6 - 2.6 mg/dL Final     Phosphorus   Date Value Ref Range Status   03/17/2022 3.1 2.5 - 4.5 mg/dL Final     No results for input(s): PH, PO2, PCO2, HCO3, BE, O2SAT in the last 72 hours. RADIOLOGY:  CTA CHEST W CONTRAST   Final Result   1. Mass located in left upper lobe related to neoplasm or pneumonia. 2. Emphysematous changes. 3. No mediastinal lymphadenopathy. XR CHEST (2 VW)   Final Result   New masslike opacity in peripheral left upper lobe related to focal pneumonia   or neoplasm. CT chest recommended. PROBLEM LIST:  Principal Problem:    Left upper lobe pneumonia  Active Problems:    Lung mass    Pneumonia of left upper lobe due to infectious organism    Heroin use disorder, severe, in sustained remission ([address]    COPD suggested by initial evaluation ([address]  Resolved Problems:    * No resolved hospital problems. *      IMPRESSION:  1. Left upper lobe mass/infiltrate/nodule abutting left upper lobe pleural surface causing chest wall pain  2.  Probable COPD  3. Former history of heroin use but not for years  4. History of Covid positivity January 1    PLAN:  1. Continue IV antibiotics including azithromycin and Unasyn  2. Chest x-ray in a.m. 3. Continue to rule out TB  4. No more smoking  5. Look for clinical response to current treatments being given  patient to this visit. They had the opportunity to ask questions about the proposed management plans and to have those questions answered. This patient has a high probability of sudden, clinically significant deterioration, which requires the highest level of physician preparedness to intervene urgently. I managed/supervised life or organ supporting interventions that required frequent physician assessment. I devoted my full attention to the direct care of this patient for the amount of time indicated below.   Time I spent with the family or surrogate(s) is included only if the patient was incapable of providing th      Electronically signed by Abraham Woo MD on 3/17/2022 at 3:52 PM

## 2022-03-17 NOTE — PROGRESS NOTES
Comprehensive Nutrition Assessment    Type and Reason for Visit:  Initial,Positive Nutrition Screen    Nutrition Recommendations/Plan: Start Ensure Enlive BID to help meet estimated nutrient needs. Will continue to monitor. Nutrition Assessment:  Pt adm w/ chest pain/SOB 2/2 PNA, lung mass, COPD. Hx heroin abuse, in remission. Pt w/ decreased PO, will add Ensure Enlive BID & monitor. Malnutrition Assessment:  Malnutrition Status:  Insufficient data    Context:  Acute Illness     Findings of the 6 clinical characteristics of malnutrition:  Energy Intake:  Mild decrease in energy intake (Comment)  Weight Loss:  Unable to assess     Body Fat Loss:  Unable to assess     Muscle Mass Loss:  Unable to assess    Fluid Accumulation:  No significant fluid accumulation     Strength:  Not Performed    Estimated Daily Nutrient Needs:  Energy (kcal):  2997-6446; Weight Used for Energy Requirements:  Current     Protein (g):  60-70; Weight Used for Protein Requirements:  Current (1.2-1.4)        Fluid (ml/day):  9768-5997; Method Used for Fluid Requirements:  1 ml/kcal      Nutrition Related Findings:  A&O, abd WNL, no noted edema, I/Os WNL      Wounds:  None       Current Nutrition Therapies:    ADULT DIET; Regular    Anthropometric Measures:  · Height: 5' 1\" (154.9 cm)  · Current Body Weight: 112 lb (50.8 kg) (3/16 stated, UTO updated measured wt)   · Usual Body Weight: 102 lb (46.3 kg) (11/19/21 actual x 4 months)     · Ideal Body Weight: 105 lbs; % Ideal Body Weight 106.7 %   · BMI: 21.2  · BMI Categories: Normal Weight (BMI 18.5-24. 9)       Nutrition Diagnosis:   · Inadequate oral intake related to impaired respiratory function as evidenced by intake 0-25%    Nutrition Interventions:   Food and/or Nutrient Delivery:  Continue Current Diet,Start Oral Nutrition Supplement (Ensure Enlive BID)  Nutrition Education/Counseling:  Education not indicated   Coordination of Nutrition Care:  Continue to monitor while inpatient    Goals:  Pt consumes >50% meals/ONS       Nutrition Monitoring and Evaluation:   Behavioral-Environmental Outcomes:  None Identified   Food/Nutrient Intake Outcomes:  Food and Nutrient Intake,Supplement Intake  Physical Signs/Symptoms Outcomes:  Biochemical Data,Nutrition Focused Physical Findings,Skin,Weight,Chewing or Swallowing,GI Status,Fluid Status or Edema     Discharge Planning:     Too soon to determine     Electronically signed by Garrison Briggs MS, RD, LD on 3/17/22 at 11:41 AM EDT    Contact: [phone]

## 2022-03-17 NOTE — PROGRESS NOTES
[address] [place], [place] [address]   Progress Note    Admitting Date and Time: 3/16/2022  1:10 PM  Admit Dx: Lung mass [R91.8]  Pneumonia of left upper lobe due to infectious organism [J18.9]    Subjective/interval history:    Pt admitted yesterday afternoon with possible left upper lobe lung mass versus bacterial pneumonia in the setting of recent COVID-19 infection. Today she still has pleuritic pain. Not producing a significant amount of sputum. She is currently negative pressure isolation until tuberculosis has been ruled out. T spot in process. Pulmonology evaluated and also recommended outpatient percutaneous biopsy and PET scan if this does not improve after several days of IV antibiotics. ROS: denies fever, chills, cp, sob, n/v, HA unless stated above.  sodium chloride flush  5-40 mL IntraVENous 2 times per day    enoxaparin  40 mg SubCUTAneous Daily    ampicillin-sulbactam  3,000 mg IntraVENous Q6H    azithromycin  500 mg IntraVENous Q24H    nicotine  1 patch TransDERmal Daily    busPIRone  20 mg Oral BID    folic acid  1 mg Oral Daily    thiamine mononitrate  100 mg Oral Daily    ascorbic acid  500 mg Oral BID     brompheniramine-pseudoephedrine-DM, 5 mL, 4x Daily PRN  sodium chloride flush, 5-40 mL, PRN  sodium chloride, 25 mL, PRN  ondansetron, 4 mg, Q8H PRN   Or  ondansetron, 4 mg, Q6H PRN  polyethylene glycol, 17 g, Daily PRN  acetaminophen, 650 mg, Q6H PRN   Or  acetaminophen, 650 mg, Q6H PRN  ketorolac, 15 mg, Q6H PRN         Objective:    BP (!) 100/56   Pulse 72   Temp 97.9 °F (36.6 °C) (Oral)   Resp 18   Ht 5' 1\" (1.549 m)   Wt 112 lb (50.8 kg)   SpO2 97%   BMI 21.16 kg/m²     General Appearance: alert and oriented to person, place and time.   Appears less anxious today  Skin: warm and dry  Head: normocephalic and atraumatic  Eyes: pupils equal, round, and reactive to light, extraocular eye movements intact, conjunctivae normal  Neck: neck supple and non tender without mass   Pulmonary/Chest: Nonlabored on room air. Diminished but otherwise clear to auscultation bilaterally. Cardiovascular: normal rate, normal S1 and S2 and no carotid bruits  Abdomen: soft, non-tender, non-distended, normal bowel sounds, no masses or organomegaly  Extremities: no cyanosis, no clubbing and no edema  Neurologic: no cranial nerve deficit and speech normal         Recent Labs     03/16/22  1346 03/17/22  0615    136   K 3.5 3.8    106   CO2 18* 16*   BUN 7 5*   CREATININE 0.4* 0.4*   GLUCOSE 105* 85   CALCIUM 9.1 8.7       Recent Labs     03/17/22  0615   ALKPHOS 58   PROT 7.2   LABALBU 3.4*   BILITOT 0.6   AST 35*   ALT 37*       Recent Labs     03/16/22  1346 03/17/22  0615   WBC 8.6 6.5   RBC 4.20 3.77   HGB 13.6 12.2   HCT 38.7 34.7   MCV 92.1 92.0   MCH 32.4 32.4   MCHC 35.1* 35.2*   RDW 12.4 12.3    282   MPV 9.7 9.6       Radiology:   CTA CHEST W CONTRAST   Final Result   1. Mass located in left upper lobe related to neoplasm or pneumonia. 2. Emphysematous changes. 3. No mediastinal lymphadenopathy. XR CHEST (2 VW)   Final Result   New masslike opacity in peripheral left upper lobe related to focal pneumonia   or neoplasm. CT chest recommended. Assessment and Plan:  Principal Problem:    Left upper lobe pneumonia  Active Problems:    Lung mass    Pneumonia of left upper lobe due to infectious organism    Heroin use disorder, severe, in sustained remission ([address]    COPD suggested by initial evaluation ([address]  Resolved Problems:    * No resolved hospital problems. *        1. Left upper lobe pneumonia in the setting of prior COVID-19 infection  -Continue Unasyn and azithromycin  -Pneumo and Legionella urine antigens presumptive negative, so we will hold off on de-escalating antibiotics at this point     2. Possible left upper lobe mass  -Given appearance on CT, concerning for mass versus pneumonia  -T spot test in process.   She does report having a negative TB skin test recently  -If no improvement with IV antibiotics, she will need outpatient percutaneous biopsy and PET scan     3. Heroin use disorder in sustained remission  -in recovery for 3 years  -No narcotic pain medications per patient     4. Tobacco abuse with CT findings consistent with COPD  -Counseled on cessation  -Nicotine patch  -Not requiring supplemental oxygen at this time     5. Anxiety  -Continue buspirone     Code Status: Full code  DVT prophylaxis: Enoxaparin     Disposition: Plan is for discharge home. Likely in 2 to 3 days    NOTE: This report was transcribed using voice recognition software. Every effort was made to ensure accuracy; however, inadvertent computerized transcription errors may be present.      Electronically signed by Mark Correa DO on 3/17/2022 at 3:51 PM

## 2022-03-18 ENCOUNTER — APPOINTMENT (OUTPATIENT)
Dept: GENERAL RADIOLOGY | Age: 42
DRG: 194 | End: 2022-03-18
Payer: COMMERCIAL

## 2022-03-18 PROBLEM — R78.81 GRAM-POSITIVE BACTEREMIA: Status: ACTIVE | Noted: 2022-03-18

## 2022-03-18 LAB
ACINETOBACTER CALCOAC BAUMANNII COMPLEX BY PCR: NOT DETECTED
BACTEROIDES FRAGILIS BY PCR: NOT DETECTED
BOTTLE TYPE: ABNORMAL
CANDIDA ALBICANS BY PCR: NOT DETECTED
CANDIDA AURIS BY PCR: NOT DETECTED
CANDIDA GLABRATA BY PCR: NOT DETECTED
CANDIDA KRUSEI BY PCR: NOT DETECTED
CANDIDA PARAPSILOSIS BY PCR: NOT DETECTED
CANDIDA TROPICALIS BY PCR: NOT DETECTED
CRYPTOCOCCUS NEOFORMANS/GATTII BY PCR: NOT DETECTED
EKG ATRIAL RATE: 71 BPM
EKG P AXIS: 61 DEGREES
EKG P-R INTERVAL: 182 MS
EKG Q-T INTERVAL: 378 MS
EKG QRS DURATION: 94 MS
EKG QTC CALCULATION (BAZETT): 410 MS
EKG R AXIS: 53 DEGREES
EKG T AXIS: 54 DEGREES
EKG VENTRICULAR RATE: 71 BPM
ENTEROBACTER CLOACAE COMPLEX BY PCR: NOT DETECTED
ENTEROBACTERALES BY PCR: NOT DETECTED
ENTEROCOCCUS FAECALIS BY PCR: NOT DETECTED
ENTEROCOCCUS FAECIUM BY PCR: NOT DETECTED
ESCHERICHIA COLI BY PCR: NOT DETECTED
HAEMOPHILUS INFLUENZAE BY PCR: NOT DETECTED
KLEBSIELLA AEROGENES BY PCR: NOT DETECTED
KLEBSIELLA OXYTOCA BY PCR: NOT DETECTED
KLEBSIELLA PNEUMONIAE GROUP BY PCR: NOT DETECTED
LISTERIA MONOCYTOGENES BY PCR: NOT DETECTED
LV EF: 64 %
LVEF MODALITY: NORMAL
NEISSERIA MENINGITIDIS BY PCR: NOT DETECTED
ORDER NUMBER: ABNORMAL
PROTEUS SPECIES BY PCR: NOT DETECTED
PSEUDOMONAS AERUGINOSA BY PCR: NOT DETECTED
SALMONELLA SPECIES BY PCR: NOT DETECTED
SERRATIA MARCESCENS BY PCR: NOT DETECTED
SOURCE OF BLOOD CULTURE: ABNORMAL
STAPHYLOCOCCUS AUREUS BY PCR: NOT DETECTED
STAPHYLOCOCCUS EPIDERMIDIS BY PCR: NOT DETECTED
STAPHYLOCOCCUS LUGDUNENSIS BY PCR: NOT DETECTED
STAPHYLOCOCCUS SPECIES BY PCR: DETECTED
STENOTROPHOMONAS MALTOPHILIA BY PCR: NOT DETECTED
STREPTOCOCCUS AGALACTIAE BY PCR: NOT DETECTED
STREPTOCOCCUS PNEUMONIAE BY PCR: NOT DETECTED
STREPTOCOCCUS PYOGENES  BY PCR: NOT DETECTED
STREPTOCOCCUS SPECIES BY PCR: NOT DETECTED

## 2022-03-18 PROCEDURE — 36415 COLL VENOUS BLD VENIPUNCTURE: CPT

## 2022-03-18 PROCEDURE — 93306 TTE W/DOPPLER COMPLETE: CPT

## 2022-03-18 PROCEDURE — 93010 ELECTROCARDIOGRAM REPORT: CPT | Performed by: INTERNAL MEDICINE

## 2022-03-18 PROCEDURE — 6370000000 HC RX 637 (ALT 250 FOR IP): Performed by: INTERNAL MEDICINE

## 2022-03-18 PROCEDURE — 6370000000 HC RX 637 (ALT 250 FOR IP): Performed by: EMERGENCY MEDICINE

## 2022-03-18 PROCEDURE — 6360000002 HC RX W HCPCS: Performed by: INTERNAL MEDICINE

## 2022-03-18 PROCEDURE — 87040 BLOOD CULTURE FOR BACTERIA: CPT

## 2022-03-18 PROCEDURE — 1200000000 HC SEMI PRIVATE

## 2022-03-18 PROCEDURE — 2580000003 HC RX 258: Performed by: INTERNAL MEDICINE

## 2022-03-18 PROCEDURE — 71046 X-RAY EXAM CHEST 2 VIEWS: CPT

## 2022-03-18 PROCEDURE — 99233 SBSQ HOSP IP/OBS HIGH 50: CPT | Performed by: INTERNAL MEDICINE

## 2022-03-18 RX ORDER — HYDROXYZINE PAMOATE 25 MG/1
50 CAPSULE ORAL 3 TIMES DAILY PRN
Status: DISCONTINUED | OUTPATIENT
Start: 2022-03-18 | End: 2022-03-19 | Stop reason: HOSPADM

## 2022-03-18 RX ORDER — SODIUM BICARBONATE 650 MG/1
650 TABLET ORAL 4 TIMES DAILY
Status: COMPLETED | OUTPATIENT
Start: 2022-03-18 | End: 2022-03-19

## 2022-03-18 RX ADMIN — BUSPIRONE HYDROCHLORIDE 20 MG: 10 TABLET ORAL at 21:37

## 2022-03-18 RX ADMIN — VANCOMYCIN HYDROCHLORIDE 750 MG: 10 INJECTION, POWDER, LYOPHILIZED, FOR SOLUTION INTRAVENOUS at 20:31

## 2022-03-18 RX ADMIN — VANCOMYCIN HYDROCHLORIDE 1000 MG: 1 INJECTION, POWDER, LYOPHILIZED, FOR SOLUTION INTRAVENOUS at 11:31

## 2022-03-18 RX ADMIN — OXYCODONE HYDROCHLORIDE AND ACETAMINOPHEN 500 MG: 500 TABLET ORAL at 10:19

## 2022-03-18 RX ADMIN — Medication 10 ML: at 10:37

## 2022-03-18 RX ADMIN — THIAMINE HCL TAB 100 MG 100 MG: 100 TAB at 10:19

## 2022-03-18 RX ADMIN — KETOROLAC TROMETHAMINE 15 MG: 15 INJECTION, SOLUTION INTRAMUSCULAR; INTRAVENOUS at 10:45

## 2022-03-18 RX ADMIN — SODIUM BICARBONATE 650 MG TABLET 650 MG: at 21:37

## 2022-03-18 RX ADMIN — FOLIC ACID 1 MG: 1 TABLET ORAL at 10:19

## 2022-03-18 RX ADMIN — KETOROLAC TROMETHAMINE 15 MG: 15 INJECTION, SOLUTION INTRAMUSCULAR; INTRAVENOUS at 23:20

## 2022-03-18 RX ADMIN — SODIUM CHLORIDE 3000 MG: 900 INJECTION INTRAVENOUS at 10:26

## 2022-03-18 RX ADMIN — SODIUM BICARBONATE 650 MG TABLET 650 MG: at 13:23

## 2022-03-18 RX ADMIN — KETOROLAC TROMETHAMINE 15 MG: 15 INJECTION, SOLUTION INTRAMUSCULAR; INTRAVENOUS at 17:09

## 2022-03-18 RX ADMIN — ACETAMINOPHEN 650 MG: 325 TABLET ORAL at 15:06

## 2022-03-18 RX ADMIN — SODIUM CHLORIDE 3000 MG: 900 INJECTION INTRAVENOUS at 01:16

## 2022-03-18 RX ADMIN — BUSPIRONE HYDROCHLORIDE 20 MG: 10 TABLET ORAL at 10:19

## 2022-03-18 RX ADMIN — ENOXAPARIN SODIUM 40 MG: 100 INJECTION SUBCUTANEOUS at 10:20

## 2022-03-18 RX ADMIN — SODIUM CHLORIDE 3000 MG: 900 INJECTION INTRAVENOUS at 16:29

## 2022-03-18 RX ADMIN — SODIUM CHLORIDE 3000 MG: 900 INJECTION INTRAVENOUS at 21:37

## 2022-03-18 RX ADMIN — AZITHROMYCIN MONOHYDRATE 500 MG: 500 INJECTION, POWDER, LYOPHILIZED, FOR SOLUTION INTRAVENOUS at 17:15

## 2022-03-18 RX ADMIN — OXYCODONE HYDROCHLORIDE AND ACETAMINOPHEN 500 MG: 500 TABLET ORAL at 21:37

## 2022-03-18 RX ADMIN — SODIUM BICARBONATE 650 MG TABLET 650 MG: at 17:23

## 2022-03-18 ASSESSMENT — PAIN DESCRIPTION - LOCATION
LOCATION: GENERALIZED
LOCATION: GENERALIZED;CHEST

## 2022-03-18 ASSESSMENT — PAIN SCALES - GENERAL
PAINLEVEL_OUTOF10: 3
PAINLEVEL_OUTOF10: 0
PAINLEVEL_OUTOF10: 7
PAINLEVEL_OUTOF10: 7
PAINLEVEL_OUTOF10: 0

## 2022-03-18 ASSESSMENT — PAIN - FUNCTIONAL ASSESSMENT
PAIN_FUNCTIONAL_ASSESSMENT: PREVENTS OR INTERFERES SOME ACTIVE ACTIVITIES AND ADLS
PAIN_FUNCTIONAL_ASSESSMENT: PREVENTS OR INTERFERES SOME ACTIVE ACTIVITIES AND ADLS

## 2022-03-18 ASSESSMENT — PAIN DESCRIPTION - PAIN TYPE
TYPE: ACUTE PAIN
TYPE: ACUTE PAIN

## 2022-03-18 ASSESSMENT — PAIN DESCRIPTION - DESCRIPTORS
DESCRIPTORS: DISCOMFORT
DESCRIPTORS: DISCOMFORT;SHARP;JABBING

## 2022-03-18 ASSESSMENT — PAIN DESCRIPTION - ONSET
ONSET: GRADUAL
ONSET: ON-GOING

## 2022-03-18 ASSESSMENT — PAIN DESCRIPTION - ORIENTATION: ORIENTATION: LEFT

## 2022-03-18 ASSESSMENT — PAIN DESCRIPTION - FREQUENCY
FREQUENCY: INTERMITTENT
FREQUENCY: INTERMITTENT

## 2022-03-18 NOTE — PROGRESS NOTES
Pharmacy Consultation Note  (Antibiotic Dosing and Monitoring)    Initial consult date: 3/18/22  Consulting physician/provider: Ana Paula Bower DO   Drug: Vancomycin  Indication: Bloodstream infection    Age/  Gender Height Weight IBW  Allergy Information   42 y.o./female 5' 1\" (154.9 cm) 112 lb (50.8 kg)     Ideal body weight: 47.8 kg (105 lb 6.1 oz)  Adjusted ideal body weight: 49 kg (108 lb 0.4 oz)   Imitrex [sumatriptan] and Naltrexone      Renal Function:  Recent Labs     03/16/22  1346 03/17/22  0615   BUN 7 5*   CREATININE 0.4* 0.4*       Intake/Output Summary (Last 24 hours) at 3/18/2022 1503  Last data filed at 3/18/2022 1335  Gross per 24 hour   Intake 1250.48 ml   Output 200 ml   Net 1050.48 ml       Vancomycin Monitoring:  Trough:  No results for input(s): VANCOTROUGH in the last 72 hours. Random:  No results for input(s): VANCORANDOM in the last 72 hours. Vancomycin Administration Times:  Recent vancomycin administrations                   vancomycin (VANCOCIN) 1,000 mg in dextrose 5 % 250 mL IVPB (mg) 1,000 mg New Bag 03/18/22 1131                Assessment:  · Patient is a 38 y.o. female who has been initiated on vancomycin  · Estimated Creatinine Clearance: 141 mL/min (A) (based on SCr of 0.4 mg/dL (L)). · To dose vancomycin, pharmacy will be utilizing Neuronetrix calculation software for goal AUC/SARAH 400-600 mg/L-hr    Plan:  · Will continue vancomycin 750 mg IV every 8 hours  Will check vancomycin levels 3/19 at 0330; hold dose only if level is >20.   · Will continue to monitor renal function   · Clinical pharmacy to follow      Ange Jules [facility] 3/18/2022 3:03 PM

## 2022-03-18 NOTE — CARE COORDINATION
3-18-Cm note: ( no covid testing) Per Dr. Burak Christian pt's cx's may be contaminate. Most likely dc on oral abx's tomorrow. Pt's  will provide transport home. CM/SS will follow.  Electronically signed by Kartik Iniguez RN on 3/18/2022 at 11:07 AM

## 2022-03-18 NOTE — PROGRESS NOTES
PULMONARY/ CRITICAL CARE MEDICINE FOLLOW UP    38year old person with PMH as described below admitted to hospital for management of  left upper lobe pneumonia /mass/ nodule, COPD, chest pain secondary to location of lesion abutting left upper lobe pleural surface. --Ms Brar is on room air  --Cultures reviewed, she has Staphylococcal bacteremia, receiving Unasyn , azithromycin and vancomycin   --Chest X ray with no change    /61   Pulse 80   Temp 97.9 °F (36.6 °C) (Oral)   Resp 16   Ht 5' 1\" (1.549 m)   Wt 112 lb (50.8 kg)   SpO2 98%   BMI 21.16 kg/m²   General: Awake, oriented to place, time and person  HEENT: No head lesions, PERRL, EOMI, mouth without lesions, no nasal lesions, no cervical adenopathy palpated  Respiratory: Lungs with equal breath sounds bilaterally, no adventitious sounds auscultated, no accessory muscle use  CV: Regular rate, no murmurs, no JVD, no leg edema  Abdomen: Soft, non tender, + bowel sounds, no lesions  Skin: Hydrated, adequate turgor, no rash, capillary refill <2 seconds  Extremities: Muscular strength 5/5 in 4 limbs, moves 4 limbs spontaneously, distal pulses present  Neurology: Awake and alert, follows commands, moves 4 limbs on command and spontaneously, neck is supple, no meningitic signs present. Assessment:   1. Left upper lobe mass/infiltrate/nodule abutting left upper lobe pleural surface causing chest wall pain  2. Prossible COPD  3. Former history of heroin use  4. History of Covid positivity January 1     PLAN:  1. Continue IV antibiotics including azithromycin and Unasyn for 8 days   2. Needs to rule out TB - IGRA in process  3. Smoking cessation recommended    Ms Brar needs CT scan of chest 4 weeks after finishing antibiotics to ensure resolution of lesion. If there is no resolution, she will need PET scan and biopsy. Please refer to outpatient pulmonology for follow up. We will continue following with you.      Rest of supportive care as per primary team      MEDICATIONS:   sodium bicarbonate  650 mg Oral 4x Daily    sodium chloride flush  5-40 mL IntraVENous 2 times per day    enoxaparin  40 mg SubCUTAneous Daily    ampicillin-sulbactam  3,000 mg IntraVENous Q6H    azithromycin  500 mg IntraVENous Q24H    nicotine  1 patch TransDERmal Daily    busPIRone  20 mg Oral BID    folic acid  1 mg Oral Daily    thiamine mononitrate  100 mg Oral Daily    ascorbic acid  500 mg Oral BID      sodium chloride       perflutren lipid microspheres, brompheniramine-pseudoephedrine-DM, sodium chloride flush, sodium chloride, ondansetron **OR** ondansetron, polyethylene glycol, acetaminophen **OR** acetaminophen, ketorolac    OBJECTIVE:  Vitals:    03/18/22 0745   BP: 120/61   Pulse: 80   Resp: 16   Temp:    SpO2: 98%           O2 Device: None (Room air)        LABS:  WBC   Date Value Ref Range Status   03/17/2022 6.5 4.5 - 11.5 E9/L Final   03/16/2022 8.6 4.5 - 11.5 E9/L Final   05/13/2021 6.3 4.5 - 11.5 E9/L Final     Hemoglobin   Date Value Ref Range Status   03/17/2022 12.2 11.5 - 15.5 g/dL Final   03/16/2022 13.6 11.5 - 15.5 g/dL Final   05/13/2021 14.2 11.5 - 15.5 g/dL Final     Hematocrit   Date Value Ref Range Status   03/17/2022 34.7 34.0 - 48.0 % Final   03/16/2022 38.7 34.0 - 48.0 % Final   05/13/2021 40.4 34.0 - 48.0 % Final     MCV   Date Value Ref Range Status   03/17/2022 92.0 80.0 - 99.9 fL Final   03/16/2022 92.1 80.0 - 99.9 fL Final   05/13/2021 91.8 80.0 - 99.9 fL Final     Platelets   Date Value Ref Range Status   03/17/2022 282 130 - 450 E9/L Final   03/16/2022 285 130 - 450 E9/L Final   05/13/2021 247 130 - 450 E9/L Final     Sodium   Date Value Ref Range Status   03/17/2022 136 132 - 146 mmol/L Final   03/16/2022 133 132 - 146 mmol/L Final   05/13/2021 137 132 - 146 mmol/L Final     Potassium   Date Value Ref Range Status   03/17/2022 3.8 3.5 - 5.0 mmol/L Final   03/16/2022 3.5 3.5 - 5.0 mmol/L Final   05/13/2021 4.1 3.5 - 5.0 mmol/L Final     Chloride   Date Value Ref Range Status   03/17/2022 106 98 - 107 mmol/L Final   03/16/2022 101 98 - 107 mmol/L Final   05/13/2021 101 98 - 107 mmol/L Final     CO2   Date Value Ref Range Status   03/17/2022 16 (L) 22 - 29 mmol/L Final   03/16/2022 18 (L) 22 - 29 mmol/L Final   05/13/2021 27 22 - 29 mmol/L Final     BUN   Date Value Ref Range Status   03/17/2022 5 (L) 6 - 20 mg/dL Final   03/16/2022 7 6 - 20 mg/dL Final   05/13/2021 8 6 - 20 mg/dL Final     CREATININE   Date Value Ref Range Status   03/17/2022 0.4 (L) 0.5 - 1.0 mg/dL Final   03/16/2022 0.4 (L) 0.5 - 1.0 mg/dL Final   05/13/2021 0.6 0.5 - 1.0 mg/dL Final     Glucose   Date Value Ref Range Status   03/17/2022 85 74 - 99 mg/dL Final   03/16/2022 105 (H) 74 - 99 mg/dL Final   05/13/2021 79 74 - 99 mg/dL Final     Calcium   Date Value Ref Range Status   03/17/2022 8.7 8.6 - 10.2 mg/dL Final   03/16/2022 9.1 8.6 - 10.2 mg/dL Final   05/13/2021 9.5 8.6 - 10.2 mg/dL Final     Total Protein   Date Value Ref Range Status   03/17/2022 7.2 6.4 - 8.3 g/dL Final   05/13/2021 8.1 6.4 - 8.3 g/dL Final   02/11/2020 8.7 (H) 6.4 - 8.3 g/dL Final     Albumin   Date Value Ref Range Status   03/17/2022 3.4 (L) 3.5 - 5.2 g/dL Final   05/13/2021 4.6 3.5 - 5.2 g/dL Final   02/11/2020 4.7 3.5 - 5.2 g/dL Final     Total Bilirubin   Date Value Ref Range Status   03/17/2022 0.6 0.0 - 1.2 mg/dL Final   05/13/2021 0.7 0.0 - 1.2 mg/dL Final   02/11/2020 0.5 0.0 - 1.2 mg/dL Final     Alkaline Phosphatase   Date Value Ref Range Status   03/17/2022 58 35 - 104 U/L Final   05/13/2021 66 35 - 104 U/L Final   02/11/2020 66 35 - 104 U/L Final     AST   Date Value Ref Range Status   03/17/2022 35 (H) 0 - 31 U/L Final     Comment:     Specimen is slightly Hemolyzed. Result may be artificially increased.    05/13/2021 34 (H) 0 - 31 U/L Final   02/11/2020 31 0 - 31 U/L Final     ALT   Date Value Ref Range Status   03/17/2022 37 (H) 0 - 32 U/L Final   05/13/2021 42 (H) 0 - 32 U/L Final   02/11/2020 46 (H) 0 - 32 U/L Final     GFR Non-   Date Value Ref Range Status   03/17/2022 >60 >=60 mL/min/1.73 Final     Comment:     Chronic Kidney Disease: less than 60 ml/min/1.73 sq.m. Kidney Failure: less than 15 ml/min/1.73 sq.m. Results valid for patients 18 years and older. 03/16/2022 >60 >=60 mL/min/1.73 Final     Comment:     Chronic Kidney Disease: less than 60 ml/min/1.73 sq.m. Kidney Failure: less than 15 ml/min/1.73 sq.m. Results valid for patients 18 years and older. 05/13/2021 >60 >=60 mL/min/1.73 Final     Comment:     Chronic Kidney Disease: less than 60 ml/min/1.73 sq.m. Kidney Failure: less than 15 ml/min/1.73 sq.m. Results valid for patients 18 years and older. GFR    Date Value Ref Range Status   03/17/2022 >60  Final   03/16/2022 >60  Final   05/13/2021 >60  Final     Magnesium   Date Value Ref Range Status   03/17/2022 2.4 1.6 - 2.6 mg/dL Final     Phosphorus   Date Value Ref Range Status   03/17/2022 3.1 2.5 - 4.5 mg/dL Final     No results for input(s): PH, PO2, PCO2, HCO3, BE, O2SAT in the last 72 hours. RADIOLOGY:  XR CHEST (2 VW)   Preliminary Result   1. There is no interval change in the 4.4 x 4.2 cm masslike opacity within   the left upper lobe which is worrisome for malignancy. If there is no   interval change in the size of the opacity after antibiotic therapy dedicated   follow-up CT-guided biopsy would therefore be recommended. CTA CHEST W CONTRAST   Final Result   1. Mass located in left upper lobe related to neoplasm or pneumonia. 2. Emphysematous changes. 3. No mediastinal lymphadenopathy. XR CHEST (2 VW)   Final Result   New masslike opacity in peripheral left upper lobe related to focal pneumonia   or neoplasm. CT chest recommended.            PROBLEM LIST:  Principal Problem:    Left upper lobe pneumonia  Active Problems:    Lung mass    Pneumonia of left upper lobe due to infectious organism    Heroin use disorder, severe, in sustained remission ([facility] [address]    COPD suggested by initial evaluation [facility])  Resolved Problems:    * No resolved hospital problems.  *    Melissa Jama MD  Pulmonary and Critical Care Medicine

## 2022-03-18 NOTE — CONSULTS
[facility]  Consult Note    [address] [facility] [address]  [place], [address]  Phone [phone]   Formerly [phone]      Admit Date: 3/16/2022  1:10 PM  Pt Name: Nithya Bennett  MRN: [id number]  : 1980  Reason for Consult:    Chief Complaint   Patient presents with    Chest Pain     times one and a half weeks. Positive Covid .    Shortness of Breath     with exertion     Requesting Physician:  America Ortega DO  PCP: RAYNA Nichols CNP  History Obtained From:  patient, chart   ID consulted for Lung mass [R91.8]  Pneumonia of left upper lobe due to infectious organism [J18.9]  on hospital day [address]       Chief Complaint   Patient presents with    Chest Pain     times one and a half weeks. Positive Covid .    Shortness of Breath     with exertion     HISTORYOF PRESENT ILLNESS   Karey Brar is a 38 y.o. female who presents with   has a past medical history of Abnormal brain MRI, History of migraine, Migraine, Migraine with aura and without status migrainosus, not intractable, Substance abuse ([facility] [address] and Tobacco use disorder. ED TRIAGEVITALS  BP: 120/61, Temp: 97.9 °F (36.6 °C), Pulse: 80, Resp: 16, SpO2: 98 %  HPI   PRESENT  PT FROM [facility] WITH LEFT UPPER CHEST PAIN PROGRESSIVE  H/O COVID 2022  PT HAD FEVERS HERE  NO N/V/D/HAS HA   NO RECENT ATBX  NO IVDU FOR ABOUT 3 YEARS H/O INCARCERATION  WORKS FOR HOME [employer]     AFEBRILE ON ra  WBC8.6 CR0.4 ALT37 AST35  BLOOD CX CONS   CXRY 1. There is no interval change in the 4.4 x 4.2 cm masslike opacity within   the left upper lobe which is worrisome for malignancy.  If there is no   interval change in the size of the opacity after antibiotic therapy dedicated   follow-up CT-guided biopsy would therefore be recommended. CT CHEST 1. Mass located in left upper lobe related to neoplasm or pneumonia. 2. Emphysematous changes.    3. No mediastinal lymphadenopathy. CURRENTLY IN AIRBORNE  ISOLATION   EMOTIONAL AFTER HEARING ABOUT +HEP C SCREEN 2018  NO VIRAL LOAD      REVIEW OF SYSTEMS     CONSTITUTIONAL:   No fever, chills, weight loss  ALLERGIES:    No urticaria, hay fever,    EYES:     No blurry vision, loss of vision, eye pain  ENT:      No hearing loss, sore throat  CARDIOVASCULAR:  No chest pain or palpitations  RESPIRATORY:   No cough, +sob  ENDOCRINE:    No increase thirst, urination   HEME-LYMPH:   No easy bruising or bleeding  GI:     No nausea, vomiting or diarrhea  :     No urinary complaints  NEURO:    No seizures, stroke, HA  MUSCULOSKELETAL:   muscle aches or pain, no joint pain  SKIN:     No rash or itch  PSYCH:    No depression or anxiety    Medications Prior to Admission: ibuprofen (ADVIL;MOTRIN) 400 MG tablet, Take 1 PO Q 6 - 8 hrs With Food/Meals for Pain/Fever.   brompheniramine-pseudoephedrine-DM 2-30-10 MG/5ML syrup, Take 5 mLs by mouth 4 times daily as needed for Congestion or Cough  ondansetron (ZOFRAN ODT) 4 MG disintegrating tablet, Take 1 tablet by mouth every 8 hours as needed for Nausea or Vomiting  busPIRone HCl (BUSPAR PO), Take 20 mg by mouth 2 times daily  CURRENT MEDICATIONS     Current Facility-Administered Medications:     sodium bicarbonate tablet 650 mg, 650 mg, Oral, 4x Daily, Raghav Saldivar DO, 650 mg at 03/18/22 1323    perflutren lipid microspheres (DEFINITY) injection 1.65 mg, 1.5 mL, IntraVENous, ONCE PRN, Tiny Carlitos Saldivar DO    vancomycin (VANCOCIN) 750 mg in dextrose 5 % 250 mL IVPB, 750 mg, IntraVENous, Q8H, Raghav Saldivar DO    brompheniramine-pseudoephedrine-DM syrup 5 mL, 5 mL, Oral, 4x Daily PRN, Tiny Carlitos Saldivar DO    sodium chloride flush 0.9 % injection 5-40 mL, 5-40 mL, IntraVENous, 2 times per day, Raghav Saldivar DO, 10 mL at 03/18/22 1037    sodium chloride flush 0.9 % injection 5-40 mL, 5-40 mL, IntraVENous, PRN, Raghav Saldivar DO    0.9 % sodium chloride infusion, 25 mL, IntraVENous, PRN, Shaunna Saldivar DO    enoxaparin (LOVENOX) injection 40 mg, 40 mg, SubCUTAneous, Daily, Raghav Saldivar DO, 40 mg at 03/18/22 1020    ondansetron (ZOFRAN-ODT) disintegrating tablet 4 mg, 4 mg, Oral, Q8H PRN **OR** ondansetron (ZOFRAN) injection 4 mg, 4 mg, IntraVENous, Q6H PRN, Shaunna Saldivar DO    polyethylene glycol (GLYCOLAX) packet 17 g, 17 g, Oral, Daily PRN, Misael Godinez DO    acetaminophen (TYLENOL) tablet 650 mg, 650 mg, Oral, Q6H PRN, 650 mg at 03/18/22 1506 **OR** acetaminophen (TYLENOL) suppository 650 mg, 650 mg, Rectal, Q6H PRN, Shaunna Saldivar DO    ampicillin-sulbactam (UNASYN) 3000 mg ivpb minibag, 3,000 mg, IntraVENous, Q6H, Raghav Saldivar DO, Stopped at 03/18/22 1131    azithromycin (ZITHROMAX) 500 mg in D5W 250ml Vial Mate, 500 mg, IntraVENous, Q24H, Raghav Saldivar DO, Stopped at 03/17/22 1938    nicotine (NICODERM CQ) 14 MG/24HR 1 patch, 1 patch, TransDERmal, Daily, Mya Odom DO, 1 patch at 03/17/22 1938    ketorolac (TORADOL) injection 15 mg, 15 mg, IntraVENous, Q6H PRN, Raghav Saldivar DO, 15 mg at 03/18/22 1045    busPIRone (BUSPAR) tablet 20 mg, 20 mg, Oral, BID, Raghav Saldivar DO, 20 mg at 84/77/45 3117    folic acid (FOLVITE) tablet 1 mg, 1 mg, Oral, Daily, Ky Carrera MD, 1 mg at 03/18/22 1019    thiamine mononitrate tablet 100 mg, 100 mg, Oral, Daily, Ky Carrera MD, 100 mg at 03/18/22 1019    ascorbic acid (VITAMIN C) tablet 500 mg, 500 mg, Oral, BID, Ky Carrera MD, 500 mg at 03/18/22 1019  ALLERGIES     Imitrex [sumatriptan] and Naltrexone  There is no immunization history for the selected administration types on file for this patient.    Internal Administration   First Dose      Second Dose           Last COVID Lab SARS-CoV-2, PCR (no units)   Date Value   01/01/2022 DETECTED (A)            PAST MEDICAL HISTORY     Past Medical History:   Diagnosis Date    Abnormal brain MRI 6/26/2020    History of migraine 2020    Migraine     Migraine with aura and without status migrainosus, not intractable 2020    [address] x 2 mos. , hx migraines    Substance abuse ([address]     Tobacco use disorder 2020     SURGICAL HISTORY       Past Surgical History:   Procedure Laterality Date     SECTION      x3    CYST REMOVAL       FAMILY HISTORY       Family History   Problem Relation Age of Onset    Cerebral Aneurysm Mother      SOCIAL HISTORY       Social History     Socioeconomic History    Marital status:      Spouse name: None    Number of children: None    Years of education: None    Highest education level: None   Occupational History    None   Tobacco Use    Smoking status: Current Every Day Smoker     Packs/day: 0.50    Smokeless tobacco: Never Used   Vaping Use    Vaping Use: Every day    Substances: Always   Substance and Sexual Activity    Alcohol use: Not Currently    Drug use: Not Currently     Comment: heroin in past    Sexual activity: Yes   Other Topics Concern    None   Social History Narrative    None     Social Determinants of Health     Financial Resource Strain:     Difficulty of Paying Living Expenses: Not on file   Food Insecurity:     Worried About Running Out of Food in the Last Year: Not on file    Andrea of Food in the Last Year: Not on file   Transportation Needs:     Lack of Transportation (Medical): Not on file    Lack of Transportation (Non-Medical):  Not on file   Physical Activity:     Days of Exercise per Week: Not on file    Minutes of Exercise per Session: Not on file   Stress:     Feeling of Stress : Not on file   Social Connections:     Frequency of Communication with Friends and Family: Not on file    Frequency of Social Gatherings with Friends and Family: Not on file    Attends Shinto Services: Not on file    Active Member of Clubs or Organizations: Not on file    Attends Club or Organization Meetings: Not on file    Marital Status: Not on file   Intimate Partner Violence:     Fear of Current or Ex-Partner: Not on file    Emotionally Abused: Not on file    Physically Abused: Not on file    Sexually Abused: Not on file   Housing Stability:     Unable to Pay for Housing in the Last Year: Not on file    Number of [place] in the Last Year: Not on file    Unstable Housing in the Last Year: Not on file       PHYSICAL EXAM       ED Triage Vitals   BP Temp Temp Source Pulse Resp SpO2 Height Weight   03/16/22 1306 03/16/22 1306 03/16/22 1306 03/16/22 1305 03/16/22 1306 03/16/22 1305 03/16/22 1306 03/16/22 1306   (!) 107/59 98.3 °F (36.8 °C) Oral 100 18 98 % 5' 1\" (1.549 m) 112 lb (50.8 kg)     Vitals:    Vitals:    03/17/22 0745 03/17/22 1009 03/17/22 1300 03/18/22 0745   BP: (!) 97/48  (!) 100/56 120/61   Pulse: 61  72 80   Resp: 16  18 16   Temp: 96.4 °F (35.8 °C)  97.9 °F (36.6 °C)    TempSrc: Oral  Oral    SpO2: 98%  97% 98%   Weight:       Height:  5' 1\" (1.549 m)       Physical Exam THIN    Constitutional/General: Alert and oriented, NAD  Head: NC/AT  Eyes: PERRL, EOMI  Mouth: Normal mucosa, no thrush   Neck: Supple, full ROM,    Pulmonary: Lungs DEC  to auscultation bilaterally. Not in respiratory distress  Cardiovascular:  Regular rate and rhythm, no murmurs, gallops, or rubs. Abdomen: Soft, + BS. No distension. Nontender. Extremities: Moves all extremities x 4. Warm and well perfused  Pulses:  Distal pulses intact  Skin: Warm and dry with rash PT STATES CHRONIC CHEST AND UE   Neurologic:    No focal deficits  Psych: Normal Affect     DIAGNOSTIC RESULTS   RADIOLOGY:   XR CHEST (2 VW)    Result Date: 3/18/2022  EXAMINATION: TWO XRAY VIEWS OF THE CHEST 3/18/2022 8:20 am COMPARISON: None HISTORY: ORDERING SYSTEM PROVIDED HISTORY: sob TECHNOLOGIST PROVIDED HISTORY: Reason for exam:->sob FINDINGS: The cardiac silhouette is within normal limits.  There is no change in the appearance of the left upper lobe masslike opacity measuring 4.2 x 4.4 cm. This finding is worrisome for malignancy. The right lung is clear. There is no right or left pleural effusion. 1. There is no interval change in the 4.4 x 4.2 cm masslike opacity within the left upper lobe which is worrisome for malignancy. If there is no interval change in the size of the opacity after antibiotic therapy dedicated follow-up CT-guided biopsy would therefore be recommended. XR CHEST (2 VW)    Result Date: 3/16/2022  EXAMINATION: TWO XRAY VIEWS OF THE CHEST 3/16/2022 2:31 pm COMPARISON: April 19, 2015 HISTORY: ORDERING SYSTEM PROVIDED HISTORY: right chest pain TECHNOLOGIST PROVIDED HISTORY: Reason for exam:->right chest pain FINDINGS: Masslike opacity located in peripheral left upper lobe measures 4.3 x 4.3 cm. No pneumothorax. No pleural effusion. New masslike opacity in peripheral left upper lobe related to focal pneumonia or neoplasm. CT chest recommended. CTA CHEST W CONTRAST    Result Date: 3/16/2022  EXAMINATION: CTA OF THE CHEST 3/16/2022 2:57 pm TECHNIQUE: CTA of the chest was performed after the administration of intravenous contrast.  Multiplanar reformatted images are provided for review. MIP images are provided for review. Dose modulation, iterative reconstruction, and/or weight based adjustment of the mA/kV was utilized to reduce the radiation dose to as low as reasonably achievable. COMPARISON: None. HISTORY: ORDERING SYSTEM PROVIDED HISTORY: rule out PE, left sided chest pain TECHNOLOGIST PROVIDED HISTORY: Reason for exam:->rule out PE, left sided chest pain Decision Support Exception - unselect if not a suspected or confirmed emergency medical condition->Emergency Medical Condition (MA) FINDINGS: Mass with irregular margins and adjacent ground-glass opacities located in peripheral aspect of the left upper lobe. Small foci of gas are seen within the medial margin of the lesion. This lesion measures 3.4 x 3.7 cm.   No adjacent bony erosive changes. No additional mass present. No suspicious pulmonary nodule. Hyperinflation of airspaces throughout both lungs notable in upper lobes. Blebs and bulla are also present in the upper lobes. No pneumothorax. The heart is normal in size. No pericardial effusion. View of the upper abdomen shows grossly normal bilateral adrenal glands. 1. Mass located in left upper lobe related to neoplasm or pneumonia. 2. Emphysematous changes. 3. No mediastinal lymphadenopathy. LABS  Recent Labs     03/16/22  1346 03/17/22  0615   WBC 8.6 6.5   HGB 13.6 12.2   HCT 38.7 34.7   MCV 92.1 92.0    282     Recent Labs     03/16/22  1346 03/16/22  1346 03/17/22  0615     --  136   K 3.5   < > 3.8      < > 106   CO2 18*   < > 16*   BUN 7  --  5*   CREATININE 0.4*  --  0.4*   GFRAA >60  --  >60   LABGLOM >60  --  >60   GLUCOSE 105*  --  85   PROT  --   --  7.2   LABALBU  --   --  3.4*   CALCIUM 9.1  --  8.7   BILITOT  --   --  0.6   ALKPHOS  --   --  58   AST  --   --  35*   ALT  --   --  37*    < > = values in this interval not displayed. Lab Results   Component Value Date    COVID19 DETECTED 01/01/2022     COVID-19/JAQUI-COV2 LABS  Recent Labs     03/16/22  1346 03/17/22  0615   DDIMER 2087  --    INR 1.1  --    PROTIME 12.2  --    AST  --  35*   ALT  --  37*     Lab Results   Component Value Date    CHOL 107 05/13/2021    TRIG 64 05/13/2021    HDL 37 05/13/2021    LDLCALC 57 05/13/2021    LABVLDL 13 05/13/2021         Lab Results   Component Value Date    HCVABI REACTIVE (A) 12/21/2018     Hep C Ab Interp   Date Value Ref Range Status   12/21/2018 REACTIVE (A) NON REACT Final     MICROBIOLOGY:     Cultures :   Recent Labs     03/16/22  1702   BC 24 Hours no growth  Gram stain performed from blood culture bottle media  Gram positive cocci in clusters  Previously positive blood culture called  *     No results for input(s): ORG in the last 72 hours.   Recent Labs     03/16/22  1702   BLOODCULT2 24 Hours no growth  Gram stain performed from blood culture bottle media  Gram positive cocci in clusters  *     Recent Labs     03/16/22  1740   STREPNEUMAGU Presumptive negative- suggests no current or recent  pneumococcal infection. Infection due to Strep pneumoniae cannot be  ruled out since the antigen present in the sample  may be below the detection limit of the test.  Normal Range:Presumptive Negative       Recent Labs     03/16/22  1740   LP1UAG Presumptive Negative -suggesting no recent or current infections  with Legionella pneumophila serogroup 1. Infection to Legionella cannot be ruled out since other serogroups  and species may cause infection, antigen may not be present in  early infection, or level of antigen may be below the  detection limit. Normal Range: Presumptive Negative           Lab Results   Component Value Date    [facility]  03/16/2022     24 Hours no growth  Gram stain performed from blood culture bottle media  Gram positive cocci in clusters  Previously positive blood culture called      ORG Staphylococcus epidermidis 05/24/2021     Lab Results   Component Value Date    BLOODCULT2  03/16/2022     24 Hours no growth  Gram stain performed from blood culture bottle media  Gram positive cocci in clusters      ORG Staphylococcus epidermidis 05/24/2021       WOUND/ABSCESS   Date Value Ref Range Status   05/24/2021 Rare growth  Final     TTENo Echo indication of valvular vegetations. Normal left ventricular chamber size. Normal left ventricular systolic function, LVEF 45%. Interatrial septum not well visualized but appears intact. Normal right ventricle size and function. There is trace tricuspid regurgitation. No mitral valve prolapse. No hemodynamically significant aortic stenosis. There is trace tricuspid regurgitation. Normal aortic root size. No evidence of pericardial effusion. No intra cardiac mass or thrombus. No comparison study available.    Recommend MNAISHA if clinical suspicion is high. FINAL IMPRESSION    Patient is a 38 y.o. female who presented with   Chief Complaint   Patient presents with    Chest Pain     times one and a half weeks. Positive Covid Jan. 1st.    Shortness of Breath     with exertion    and admitted for Lung mass [R91.8]  Pneumonia of left upper lobe due to infectious organism [J18.9]     cons bacteremia H/O ivdu NOT CURRENT;Y NO HARDWARE OR RECENT PROCEDURE  -BLOOD CX X4 DRAWN CONSECUTIVELY PROB CONTAMINANT WILL REPEAT ON VANCO   H/O COVID 1/2022   Left upper lobe mass/infiltrate/nodule  ? COVID RLATED VS BACTERIAL VS MALIGNANCY FOLLOWED BY PULMONARY   H/O HEP C AB + CHECK VIRAL LOAD   SUGGEST MANISHA      ESR/CRP/PROCAL     vancomycin (VANCOCIN) 750 mg in dextrose 5 % 250 mL IVPB, Q8H  ampicillin-sulbactam (UNASYN) 3000 mg ivpb minibag, Q6H  azithromycin (ZITHROMAX) 500 mg in D5W 250ml Vial Mate, Q24H         Imaging and labs were reviewed per medical records and any ID pertinent labs were addressed with the patient. The patient/FAMILY  was educated about the diagnosis, prognosis, indications, risks and benefits of treatment. An opportunity to ask questions was given to the patient/FAMILY and questions were answered. Thank you for involving me in the care of Stephanie Brar. Please do not hesitate to call for any questions or concerns.          Electronically signed by Merline Ala, MD on 3/18/2022 at 3:42 PM

## 2022-03-19 VITALS
HEART RATE: 77 BPM | SYSTOLIC BLOOD PRESSURE: 99 MMHG | DIASTOLIC BLOOD PRESSURE: 61 MMHG | RESPIRATION RATE: 18 BRPM | BODY MASS INDEX: 21.14 KG/M2 | WEIGHT: 112 LBS | HEIGHT: 61 IN | OXYGEN SATURATION: 100 % | TEMPERATURE: 98.2 F

## 2022-03-19 PROBLEM — R63.4 UNINTENTIONAL WEIGHT LOSS: Status: ACTIVE | Noted: 2022-03-19

## 2022-03-19 PROBLEM — E87.20 NORMAL ANION GAP METABOLIC ACIDOSIS: Status: ACTIVE | Noted: 2022-03-19

## 2022-03-19 LAB
ANION GAP SERPL CALCULATED.3IONS-SCNC: 11 MMOL/L (ref 7–16)
BUN BLDV-MCNC: 4 MG/DL (ref 6–20)
C-REACTIVE PROTEIN: 0.6 MG/DL (ref 0–0.4)
CALCIUM SERPL-MCNC: 8.7 MG/DL (ref 8.6–10.2)
CHLORIDE BLD-SCNC: 108 MMOL/L (ref 98–107)
CO2: 22 MMOL/L (ref 22–29)
CREAT SERPL-MCNC: 0.4 MG/DL (ref 0.5–1)
GFR AFRICAN AMERICAN: >60
GFR NON-AFRICAN AMERICAN: >60 ML/MIN/1.73
GLUCOSE BLD-MCNC: 89 MG/DL (ref 74–99)
HCT VFR BLD CALC: 33.2 % (ref 34–48)
HEMOGLOBIN: 11.6 G/DL (ref 11.5–15.5)
MCH RBC QN AUTO: 31.7 PG (ref 26–35)
MCHC RBC AUTO-ENTMCNC: 34.9 % (ref 32–34.5)
MCV RBC AUTO: 90.7 FL (ref 80–99.9)
PDW BLD-RTO: 12.2 FL (ref 11.5–15)
PLATELET # BLD: 265 E9/L (ref 130–450)
PMV BLD AUTO: 10 FL (ref 7–12)
POTASSIUM SERPL-SCNC: 3.2 MMOL/L (ref 3.5–5)
PROCALCITONIN: 0.04 NG/ML (ref 0–0.08)
RBC # BLD: 3.66 E12/L (ref 3.5–5.5)
SEDIMENTATION RATE, ERYTHROCYTE: 37 MM/HR (ref 0–20)
SODIUM BLD-SCNC: 141 MMOL/L (ref 132–146)
VANCOMYCIN TROUGH: 8.1 MCG/ML (ref 5–16)
WBC # BLD: 7.1 E9/L (ref 4.5–11.5)

## 2022-03-19 PROCEDURE — 6360000002 HC RX W HCPCS: Performed by: INTERNAL MEDICINE

## 2022-03-19 PROCEDURE — 99233 SBSQ HOSP IP/OBS HIGH 50: CPT | Performed by: INTERNAL MEDICINE

## 2022-03-19 PROCEDURE — 85651 RBC SED RATE NONAUTOMATED: CPT

## 2022-03-19 PROCEDURE — 36415 COLL VENOUS BLD VENIPUNCTURE: CPT

## 2022-03-19 PROCEDURE — 6370000000 HC RX 637 (ALT 250 FOR IP): Performed by: EMERGENCY MEDICINE

## 2022-03-19 PROCEDURE — 85027 COMPLETE CBC AUTOMATED: CPT

## 2022-03-19 PROCEDURE — 80048 BASIC METABOLIC PNL TOTAL CA: CPT

## 2022-03-19 PROCEDURE — 6370000000 HC RX 637 (ALT 250 FOR IP): Performed by: INTERNAL MEDICINE

## 2022-03-19 PROCEDURE — 86703 HIV-1/HIV-2 1 RESULT ANTBDY: CPT

## 2022-03-19 PROCEDURE — 86706 HEP B SURFACE ANTIBODY: CPT

## 2022-03-19 PROCEDURE — 86708 HEPATITIS A ANTIBODY: CPT

## 2022-03-19 PROCEDURE — 2580000003 HC RX 258: Performed by: INTERNAL MEDICINE

## 2022-03-19 PROCEDURE — 80202 ASSAY OF VANCOMYCIN: CPT

## 2022-03-19 PROCEDURE — 86140 C-REACTIVE PROTEIN: CPT

## 2022-03-19 PROCEDURE — 84145 PROCALCITONIN (PCT): CPT

## 2022-03-19 RX ORDER — KETOROLAC TROMETHAMINE 30 MG/ML
30 INJECTION, SOLUTION INTRAMUSCULAR; INTRAVENOUS EVERY 6 HOURS PRN
Status: DISCONTINUED | OUTPATIENT
Start: 2022-03-19 | End: 2022-03-19 | Stop reason: HOSPADM

## 2022-03-19 RX ORDER — POTASSIUM CHLORIDE 20 MEQ/1
40 TABLET, EXTENDED RELEASE ORAL ONCE
Status: COMPLETED | OUTPATIENT
Start: 2022-03-19 | End: 2022-03-19

## 2022-03-19 RX ADMIN — BUSPIRONE HYDROCHLORIDE 20 MG: 10 TABLET ORAL at 11:56

## 2022-03-19 RX ADMIN — VANCOMYCIN HYDROCHLORIDE 750 MG: 10 INJECTION, POWDER, LYOPHILIZED, FOR SOLUTION INTRAVENOUS at 13:31

## 2022-03-19 RX ADMIN — SODIUM CHLORIDE 3000 MG: 900 INJECTION INTRAVENOUS at 18:25

## 2022-03-19 RX ADMIN — KETOROLAC TROMETHAMINE 30 MG: 30 INJECTION, SOLUTION INTRAMUSCULAR at 16:43

## 2022-03-19 RX ADMIN — SODIUM BICARBONATE 650 MG TABLET 650 MG: at 11:56

## 2022-03-19 RX ADMIN — AZITHROMYCIN MONOHYDRATE 500 MG: 500 INJECTION, POWDER, LYOPHILIZED, FOR SOLUTION INTRAVENOUS at 16:50

## 2022-03-19 RX ADMIN — VANCOMYCIN HYDROCHLORIDE 750 MG: 10 INJECTION, POWDER, LYOPHILIZED, FOR SOLUTION INTRAVENOUS at 04:26

## 2022-03-19 RX ADMIN — FOLIC ACID 1 MG: 1 TABLET ORAL at 11:58

## 2022-03-19 RX ADMIN — THIAMINE HCL TAB 100 MG 100 MG: 100 TAB at 11:58

## 2022-03-19 RX ADMIN — POTASSIUM CHLORIDE 40 MEQ: 20 TABLET, EXTENDED RELEASE ORAL at 11:56

## 2022-03-19 RX ADMIN — SODIUM CHLORIDE 3000 MG: 900 INJECTION INTRAVENOUS at 11:55

## 2022-03-19 RX ADMIN — SODIUM CHLORIDE 3000 MG: 900 INJECTION INTRAVENOUS at 03:16

## 2022-03-19 RX ADMIN — OXYCODONE HYDROCHLORIDE AND ACETAMINOPHEN 500 MG: 500 TABLET ORAL at 11:58

## 2022-03-19 RX ADMIN — KETOROLAC TROMETHAMINE 15 MG: 15 INJECTION, SOLUTION INTRAMUSCULAR; INTRAVENOUS at 08:02

## 2022-03-19 ASSESSMENT — PAIN DESCRIPTION - PAIN TYPE
TYPE: ACUTE PAIN
TYPE: ACUTE PAIN

## 2022-03-19 ASSESSMENT — PAIN SCALES - GENERAL
PAINLEVEL_OUTOF10: 6
PAINLEVEL_OUTOF10: 7
PAINLEVEL_OUTOF10: 7

## 2022-03-19 ASSESSMENT — PAIN DESCRIPTION - DESCRIPTORS: DESCRIPTORS: DULL

## 2022-03-19 ASSESSMENT — PAIN DESCRIPTION - FREQUENCY: FREQUENCY: CONTINUOUS

## 2022-03-19 ASSESSMENT — PAIN DESCRIPTION - ORIENTATION
ORIENTATION: LEFT;UPPER
ORIENTATION: LEFT;UPPER

## 2022-03-19 ASSESSMENT — PAIN DESCRIPTION - LOCATION
LOCATION: CHEST
LOCATION: CHEST

## 2022-03-19 ASSESSMENT — ENCOUNTER SYMPTOMS: SHORTNESS OF BREATH: 1

## 2022-03-19 NOTE — PLAN OF CARE
Problem: Pain:  Goal: Control of acute pain  Description: Control of acute pain  3/19/2022 1656 by Crow Lopez RN  Outcome: Met This Shift     Problem: Airway Clearance - Ineffective:  Goal: Clear lung sounds  Description: Clear lung sounds  Outcome: Met This Shift

## 2022-03-19 NOTE — PROGRESS NOTES
Pharmacy Consultation Note  (Antibiotic Dosing and Monitoring)    Initial consult date: 3/18/22  Consulting physician/provider: Maria Eugenia Mcduffie DO   Drug: Vancomycin  Indication: Bloodstream infection    Age/  Gender Height Weight IBW  Allergy Information   42 y.o./female 5' 1\" (154.9 cm) 112 lb (50.8 kg)     Ideal body weight: 47.8 kg (105 lb 6.1 oz)  Adjusted ideal body weight: 49 kg (108 lb 0.4 oz)   Imitrex [sumatriptan] and Naltrexone      Renal Function:  Recent Labs     03/16/22  1346 03/17/22  0615   BUN 7 5*   CREATININE 0.4* 0.4*       Intake/Output Summary (Last 24 hours) at 3/19/2022 0857  Last data filed at 3/19/2022 0400  Gross per 24 hour   Intake 682.87 ml   Output --   Net 682.87 ml       Vancomycin Monitoring:  Trough:    Recent Labs     03/19/22  0334   [address] 8.1     Random:  No results for input(s): VANCORANDOM in the last 72 hours. Recent vancomycin administrations                   vancomycin (VANCOCIN) 750 mg in dextrose 5 % 250 mL IVPB (mg) 750 mg New Bag 03/19/22 0426     750 mg New Bag 03/18/22 2031    vancomycin (VANCOCIN) 1,000 mg in dextrose 5 % 250 mL IVPB (mg) 1,000 mg New Bag 03/18/22 1131              Assessment:  · Patient is a 38 y.o. female who has been initiated on vancomycin  Estimated Creatinine Clearance: 141 mL/min (A) (based on SCr of 0.4 mg/dL (L)). · To dose vancomycin, pharmacy will be utilizing [employer] calculation software for goal AUC/SARAH 400-600 mg/L-hr   · 3/19: Level = 8.1 mcg/mL.  AUC/SARAH 403    Plan:  · Will continue vancomycin 750 mg IV every 8 hours  Repeat level as needed  · Will continue to monitor renal function   · Clinical pharmacy to follow    Cecil Matute PharmD, BCPS 3/19/2022 8:58 AM

## 2022-03-19 NOTE — PROGRESS NOTES
[address] [place] [address] Hospitalist   Progress Note    Admitting Date and Time: 3/16/2022  1:10 PM  Admit Dx: Lung mass [R91.8]  Pneumonia of left upper lobe due to infectious organism [J18.9]    Subjective/interval history:    3/17: Pt admitted yesterday afternoon with possible left upper lobe lung mass versus bacterial pneumonia in the setting of recent COVID-19 infection. Today she still has pleuritic pain. Not producing a significant amount of sputum. She is currently negative pressure isolation until tuberculosis has been ruled out. T spot in process. Pulmonology evaluated and also recommended outpatient percutaneous biopsy and PET scan if this does not improve after several days of IV antibiotics. 3/18: Patient still has some mild pain with inspiration, but they have improved. Mike Barrera for discharge home. This morning, 1 follow-up blood cultures found to have gram-positive cocci in clusters, coagulase negative staph by PCR. Started on IV vancomycin. I discussed with patient and her  that this is likely contaminant when I assessed her this morning, however this afternoon a second bottle was marked as positive for the same organism. I called the microbiology lab and confirmed that the positive results were 1 bottle positive from each set of blood cultures that were drawn from different sites. Echocardiogram was completed, with result pending. 3/19: Yesterday evening, patient has been noted in my chart that hepatitis C antibody was found to be positive in 2018, and patient was never notified of this. Hepatitis C RNA was not checked at that time. I discussed with her that to confirm chronic hepatitis C infection, we will need to check RNA. I also discussed that if she does have chronic infection, she is a good candidate for treatment given she has not use drugs or alcohol over the last 3 years.   During this conversation she also noted that all of the blood cultures were actually normal rate, normal S1 and S2 and no carotid bruits  Abdomen: soft, non-tender, non-distended, normal bowel sounds, no masses or organomegaly  Extremities: no cyanosis, no clubbing and no edema  Neurologic: no cranial nerve deficit and speech normal         Recent Labs     03/17/22  0615 03/19/22  0334    141   K 3.8 3.2*    108*   CO2 16* 22   BUN 5* 4*   CREATININE 0.4* 0.4*   GLUCOSE 85 89   CALCIUM 8.7 8.7       Recent Labs     03/17/22  0615   ALKPHOS 58   PROT 7.2   LABALBU 3.4*   BILITOT 0.6   AST 35*   ALT 37*       Recent Labs     03/17/22  0615 03/19/22  0528   WBC 6.5 7.1   RBC 3.77 3.66   HGB 12.2 11.6   HCT 34.7 33.2*   MCV 92.0 90.7   MCH 32.4 31.7   MCHC 35.2* 34.9*   RDW 12.3 12.2    265   MPV 9.6 10.0       Radiology:   XR CHEST (2 VW)   Final Result   1. There is no interval change in the 4.4 x 4.2 cm masslike opacity within   the left upper lobe which is worrisome for malignancy. If there is no   interval change in the size of the opacity after antibiotic therapy dedicated   follow-up CT-guided biopsy would therefore be recommended. CTA CHEST W CONTRAST   Final Result   1. Mass located in left upper lobe related to neoplasm or pneumonia. 2. Emphysematous changes. 3. No mediastinal lymphadenopathy. XR CHEST (2 VW)   Final Result   New masslike opacity in peripheral left upper lobe related to focal pneumonia   or neoplasm. CT chest recommended. Assessment and Plan:  Principal Problem:    Left upper lobe pneumonia  Active Problems:    Lung mass    Unintentional weight loss    Pneumonia of left upper lobe due to infectious organism    Heroin use disorder, severe, in sustained remission (HCC)    COPD suggested by initial evaluation ([address]    Gram-positive bacteremia    Normal anion gap metabolic acidosis  Resolved Problems:    * No resolved hospital problems. *        1.   Left upper lobe pneumonia in the setting of prior COVID-19 infection  -Continue Unasyn and azithromycin  -Pneumo and Legionella urine antigens presumptive negative, so we will hold off on de-escalating antibiotics at this point    2. Possible Gram-positive bacteremia vs contaminant  -Started on vancomycin  -Transthoracic echocardiogram does not show evidence of vegetation. Discussed the possibility of transesophageal echocardiogram per cardiology, however it was recommended to hold off until TB has been ruled out  -Infectious disease following     3. Possible left upper lobe mass  -Given appearance on CT, concerning for mass versus pneumonia  -T spot test in process. She does report having a negative TB skin test recently  -Discussed today that if patient stays through the weekend for possible MANISHA and/or tuberculosis rule out, we may be able to arrange percutaneous biopsy on Monday if there is no change on PA and lateral chest x-ray that morning     4.  Heroin use disorder in sustained remission  -in recovery for 3 years  -No narcotic pain medications per patient     5. Tobacco abuse with CT findings consistent with COPD  -Counseled on cessation  -Nicotine patch  -Not requiring supplemental oxygen at this time     6. Anxiety  -Continue buspirone    7. Mild normal anion gap metabolic acidosis  -resolved with oral bicarbonate supplementation    8. Unintentional weight loss  -Work-up for malignancy and tuberculosis as noted above  -Check TSH and free T4 tomorrow morning     Code Status: Full code  DVT prophylaxis: Enoxaparin     Disposition: Plan is for discharge home. Likely early next week    Discussed with Dr. Armand Hammans of pulmonary yesterday afternoon  Discussed with Dr. Lary Neves of infectious disease yesterday evening  Discussed with Dr. Alie Christian of cardiology this morning     NOTE: Portions of this report was transcribed using voice recognition software. Every effort was made to ensure accuracy; however, inadvertent computerized transcription errors may be present.      Electronically signed by Mrak Correa, DO on 3/19/2022 at 3:41 PM

## 2022-03-19 NOTE — PROGRESS NOTES
[facility]    [address] [place] [address] 80  [place], [address]  Phone [phone]   [phone]      Admit Date: 3/16/2022  1:10 PM  Pt Name: Gagan Henderson  MRN: [id number]  : 1980  Reason for Consult:    Chief Complaint   Patient presents with    Chest Pain     times one and a half weeks. Positive Covid .    Shortness of Breath     with exertion     Requesting Physician:  [employer], DO  PCP: RAYNA Rea CNP  History Obtained From:  patient, chart   ID consulted for Lung mass [R91.8]  Pneumonia of left upper lobe due to infectious organism [J18.9]  on hospital day [address] [place]       Chief Complaint   Patient presents with    Chest Pain     times one and a half weeks. Positive Covid .    Shortness of Breath     with exertion     HISTORYOF PRESENT ILLNESS   Jeri Brar is a 38 y.o. female who presents with   has a past medical history of Abnormal brain MRI, History of migraine, Migraine, Migraine with aura and without status migrainosus, not intractable, Substance abuse ([address] and Tobacco use disorder. HPI   PRESENT  PT FROM [facility] WITH LEFT UPPER CHEST PAIN PROGRESSIVE  H/O COVID 2022  PT HAD FEVERS HERE  NO N/V/D/HAS HA   NO RECENT ATBX  NO IVDU FOR ABOUT 3 YEARS H/O INCARCERATION  WORKS [employer]     AFEBRILE ON ra  WBC8.6 CR0.4 ALT37 AST35  BLOOD CX CONS   CXRY 1. There is no interval change in the 4.4 x 4.2 cm masslike opacity within   the left upper lobe which is worrisome for malignancy.  If there is no   interval change in the size of the opacity after antibiotic therapy dedicated   follow-up CT-guided biopsy would therefore be recommended. CT CHEST 1. Mass located in left upper lobe related to neoplasm or pneumonia. 2. Emphysematous changes. 3. No mediastinal lymphadenopathy.      CURRENTLY IN [place]  ISOLATION   EMOTIONAL AFTER HEARING ABOUT +HEP C SCREEN   NO VIRAL LOAD    DOS  03/19/22  IN BED  PRESENT  PT TEARFUL AGAIN  NO F/C    REVIEW OF SYSTEMS     CONSTITUTIONAL:   No fever, chills, weight loss  ALLERGIES:    No urticaria, hay fever,    EYES:     No blurry vision, loss of vision, eye pain  ENT:      No hearing loss, sore throat  CARDIOVASCULAR:  No chest pain or palpitations  RESPIRATORY:   No cough, +sob  ENDOCRINE:    No increase thirst, urination   HEME-LYMPH:   No easy bruising or bleeding  GI:     No nausea, vomiting or diarrhea  :     No urinary complaints  NEURO:    No seizures, stroke, HA  MUSCULOSKELETAL:   muscle aches or pain, no joint pain  SKIN:     No rash or itch  PSYCH:    No depression or anxiety    Medications Prior to Admission: ibuprofen (ADVIL;MOTRIN) 400 MG tablet, Take 1 PO Q 6 - 8 hrs With Food/Meals for Pain/Fever.   brompheniramine-pseudoephedrine-DM 2-30-10 MG/5ML syrup, Take 5 mLs by mouth 4 times daily as needed for Congestion or Cough  ondansetron (ZOFRAN ODT) 4 MG disintegrating tablet, Take 1 tablet by mouth every 8 hours as needed for Nausea or Vomiting  busPIRone HCl (BUSPAR PO), Take 20 mg by mouth 2 times daily  CURRENT MEDICATIONS     Current Facility-Administered Medications:     perflutren lipid microspheres (DEFINITY) injection 1.65 mg, 1.5 mL, IntraVENous, ONCE PRN, Noa Saldivar DO    vancomycin (VANCOCIN) 750 mg in dextrose 5 % 250 mL IVPB, 750 mg, IntraVENous, Q8H, Raghav Saldivar DO, Last Rate: 250 mL/hr at 03/19/22 1331, 750 mg at 03/19/22 1331    hydrOXYzine (VISTARIL) capsule 50 mg, 50 mg, Oral, TID PRN, Leno Morel DO    brompheniramine-pseudoephedrine-DM syrup 5 mL, 5 mL, Oral, 4x Daily PRN, Noa Saldivar DO    sodium chloride flush 0.9 % injection 5-40 mL, 5-40 mL, IntraVENous, 2 times per day, Raghav Saldivar DO, 10 mL at 03/18/22 1037    sodium chloride flush 0.9 % injection 5-40 mL, 5-40 mL, IntraVENous, PRN, Raghav Saldivar DO    0.9 % sodium chloride infusion, 25 mL, IntraVENous, PRN, Heaven Saldivar DO    enoxaparin (LOVENOX) injection 40 mg, 40 mg, SubCUTAneous, Daily, Raghav Saldivar DO, 40 mg at 03/18/22 1020    ondansetron (ZOFRAN-ODT) disintegrating tablet 4 mg, 4 mg, Oral, Q8H PRN **OR** ondansetron (ZOFRAN) injection 4 mg, 4 mg, IntraVENous, Q6H PRN, Heaven Saldivar DO    polyethylene glycol (GLYCOLAX) packet 17 g, 17 g, Oral, Daily PRN, Abagail Kocher, DO    acetaminophen (TYLENOL) tablet 650 mg, 650 mg, Oral, Q6H PRN, 650 mg at 03/18/22 1506 **OR** acetaminophen (TYLENOL) suppository 650 mg, 650 mg, Rectal, Q6H PRN, Heaven Saldivar DO    ampicillin-sulbactam (UNASYN) 3000 mg ivpb minibag, 3,000 mg, IntraVENous, Q6H, Raghav Saldivar DO, Stopped at 03/19/22 1325    azithromycin (ZITHROMAX) 500 mg in D5W 250ml Vial Mate, 500 mg, IntraVENous, Q24H, Raghav Saldivar DO, Stopped at 03/18/22 1905    nicotine (NICODERM CQ) 14 MG/24HR 1 patch, 1 patch, TransDERmal, Daily, Kasandra Odom DO, 1 patch at 03/18/22 1709    ketorolac (TORADOL) injection 15 mg, 15 mg, IntraVENous, Q6H PRN, Raghav Saldivar DO, 15 mg at 03/19/22 0802    busPIRone (BUSPAR) tablet 20 mg, 20 mg, Oral, BID, Raghav Saldivar DO, 20 mg at 06/29/34 7696    folic acid (FOLVITE) tablet 1 mg, 1 mg, Oral, Daily, Shazia Mcdonald MD, 1 mg at 03/19/22 1158    thiamine mononitrate tablet 100 mg, 100 mg, Oral, Daily, Shazia Mcdonald MD, 100 mg at 03/19/22 1158    ascorbic acid (VITAMIN C) tablet 500 mg, 500 mg, Oral, BID, Shazia Mcdonald MD, 500 mg at 03/19/22 1158  ALLERGIES     Imitrex [sumatriptan] and Naltrexone  There is no immunization history for the selected administration types on file for this patient.    Internal Administration   First Dose      Second Dose           Last COVID Lab SARS-CoV-2, PCR (no units)   Date Value   01/01/2022 DETECTED (A)            PAST MEDICAL HISTORY     Past Medical History:   Diagnosis Date    Abnormal brain MRI 6/26/2020    History of migraine 2020    Migraine     Migraine with aura and without status migrainosus, not intractable 2020    [facility] x 2 mos. , hx migraines    Substance abuse ([address]     Tobacco use disorder 2020     SURGICAL HISTORY       Past Surgical History:   Procedure Laterality Date     SECTION      x3    CYST REMOVAL           PHYSICAL EXAM         Vitals:    22 0745 22 1732 22 1959 22 0749   BP: 120/61  (!) 153/56 110/67   Pulse: 80 76 78 85   Resp:    Temp:   96.8 °F (36 °C) 96.2 °F (35.7 °C)   TempSrc:   Oral Oral   SpO2: 98%  99% 99%   Weight:       Height:         Physical Exam THIN    Constitutional/General: Alert and oriented, NAD  Head: NC/AT  Eyes: PERRL, EOMI    Pulmonary: Lungs DEC  to auscultation bilaterally. Cardiovascular:  Regular rate and rhythm,   Abdomen: Soft, + BS. No distension. Nontender. Extremities: Moves all extremities x 4. Warm and well perfused  Skin: Warm and dry with rash PT STATES CHRONIC CHEST AND UE   Neurologic:    No focal deficits  Psych: Normal Affect     DIAGNOSTIC RESULTS   RADIOLOGY:   XR CHEST (2 VW)    Result Date: 3/18/2022  EXAMINATION: TWO XRAY VIEWS OF THE CHEST 3/18/2022 8:20 am COMPARISON: None HISTORY: ORDERING SYSTEM PROVIDED HISTORY: sob TECHNOLOGIST PROVIDED HISTORY: Reason for exam:->sob FINDINGS: The cardiac silhouette is within normal limits. There is no change in the appearance of the left upper lobe masslike opacity measuring 4.2 x 4.4 cm. This finding is worrisome for malignancy. The right lung is clear. There is no right or left pleural effusion. 1. There is no interval change in the 4.4 x 4.2 cm masslike opacity within the left upper lobe which is worrisome for malignancy. If there is no interval change in the size of the opacity after antibiotic therapy dedicated follow-up CT-guided biopsy would therefore be recommended.      XR CHEST (2 VW)    Result Date: 3/16/2022  EXAMINATION: TWO XRAY VIEWS OF 03/17/22  0615 03/19/22  0528   WBC 6.5 7.1   HGB 12.2 11.6   HCT 34.7 33.2*   MCV 92.0 90.7    265     Recent Labs     03/17/22  0615 03/17/22  0615 03/19/22  0334     --  141   K 3.8   < > 3.2*      < > 108*   CO2 16*   < > 22   BUN 5*  --  4*   CREATININE 0.4*  --  0.4*   GFRAA >60  --  >60   LABGLOM >60  --  >60   GLUCOSE 85  --  89   PROT 7.2  --   --    LABALBU 3.4*  --   --    CALCIUM 8.7  --  8.7   BILITOT 0.6  --   --    ALKPHOS 58  --   --    AST 35*  --   --    ALT 37*  --   --     < > = values in this interval not displayed. Lab Results   Component Value Date    COVID19 DETECTED 01/01/2022     COVID-19/JAQUI-COV2 LABS  Recent Labs     03/17/22  0615 03/19/22  0534   CRP  --  0.6*   PROCAL  --  0.04   AST 35*  --    ALT 37*  --      Lab Results   Component Value Date    CHOL 107 05/13/2021    TRIG 64 05/13/2021    HDL 37 05/13/2021    LDLCALC 57 05/13/2021    LABVLDL 13 05/13/2021         Lab Results   Component Value Date    HCVABI REACTIVE (A) 12/21/2018     Hep C Ab Interp   Date Value Ref Range Status   12/21/2018 REACTIVE (A) NON REACT Final     MICROBIOLOGY:     Lab Results   Component Value Date    [facility]  03/16/2022     24 Hours no growth  Gram stain performed from blood culture bottle media  Gram positive cocci in clusters  Previously positive blood culture called      [facility]  03/16/2022     This organism was isolated in one set. Susceptibility testing is not routinely done as this  organism frequently represents skin contamination. Additional testing can be ordered by calling the  Microbiology Department. Validated susceptibility testing methods do not exist for  this isolate. ORG Micrococcus species 03/16/2022    ORG Staphylococcus species 03/16/2022    ORG Staphylococcus epidermidis 05/24/2021     WOUND/ABSCESS   Date Value Ref Range Status   05/24/2021 Rare growth  Final     TTENo Echo indication of valvular vegetations. Normal left ventricular chamber size. Normal left ventricular systolic function, LVEF 80%. Interatrial septum not well visualized but appears intact. Normal right ventricle size and function. There is trace tricuspid regurgitation. No mitral valve prolapse. No hemodynamically significant aortic stenosis. There is trace tricuspid regurgitation. Normal aortic root size. No evidence of pericardial effusion. No intra cardiac mass or thrombus. No comparison study available. Recommend MANISHA if clinical suspicion is high. FINAL IMPRESSION    Patient is a 38 y.o. female who presented with   Chief Complaint   Patient presents with    Chest Pain     times one and a half weeks. Positive Covid Jan. 1st.    Shortness of Breath     with exertion    and admitted for Lung mass [R91.8]  Pneumonia of left upper lobe due to infectious organism [J18.9]     cons bacteremia H/O ivdu NOT CURRENTLY NO HARDWARE OR RECENT PROCEDURE  -BLOOD CX X4 DRAWN CONSECUTIVELY PROB CONTAMINANT WILL REPEAT ON VANCO   H/O COVID 1/2022   Left upper lobe mass/infiltrate/nodule  ? COVID ReLATED VS BACTERIAL VS MALIGNANCY FOLLOWED BY PULMONARY   H/O HEP C AB + CHECK VIRAL LOAD   SUGGEST MANISHA  FOR LUNG BIOPSY      ESR/CRP/PROCAL     vancomycin (VANCOCIN) 750 mg in dextrose 5 % 250 mL IVPB, Q8H  ampicillin-sulbactam (UNASYN) 3000 mg ivpb minibag, Q6H  azithromycin (ZITHROMAX) 500 mg in D5W 250ml Vial Mate, Q24H         Imaging and labs were reviewed per medical records and any ID pertinent labs were addressed with the patient. The patient/FAMILY  was educated about the diagnosis, prognosis, indications, risks and benefits of treatment. An opportunity to ask questions was given to the patient/FAMILY and questions were answered. Thank you for involving me in the care of Stephanie Brar. Please do not hesitate to call for any questions or concerns.          Electronically signed by Mindy Saenz MD on 3/19/2022 at 2:23 PM

## 2022-03-20 NOTE — PROGRESS NOTES
Introduced myself to the patient and told her I had spoke with her  twice and he said she was wanting to leave against medical advice. Gamal Massed said yes she wanted to leave and was planning on getting a second opinion tomorrow am at C/C. She said she was upset with getting all the antibiotics , the iv pumps\" beeping for hours\", and felt she wasn't getting a diagnosis from the Dr I explained that the Dr 's felt it in her best interest to stay for treatment and if she chose to leave, it would be against their advice. She stated she fully understood and signed the [facility] papers.  I told her that I had instructed her  to go to ED waiting to pick her up and would await their call of his arrival.

## 2022-03-20 NOTE — PROGRESS NOTES
Continue IV diuretics as mentioned above.     Received call from [employer]  of Brayden Hong requesting to speak to his wife's Dr immediately. He stated both he and his wife are very upset with the care she is receiving and feel that she isn't getting the proper care she needs. He went on to say that he was going to take her AMA if he didn't get answers within 30 minutes. I explained that the best I could do was to speak with the Hospitalist that was here in the hospital and explain the situation. I asked for his phone number so I could return a call after I spoke with the Dr. I then called Dr Porfirio Presley and was explaining to her the situation , mid conversation he called back to say that he was on his way to get his wife have [facility] papers ready for his wife to sign. He wasn't waiting any longer and was planning on taking his wife to C/C. I told him I would have papers for his wife and would speak to her as well. I called Dr Porfirio Presley back to tell her patient was going AMA she advised that patient wear an n-95 mask while leaving.

## 2022-03-20 NOTE — PROGRESS NOTES
I took patient to the ED entrance via w/c where she met her  and then proceeded to get into their car. All without incident.

## 2022-03-20 NOTE — CARE COORDINATION
Notified by RN that  called in, very upset, feels patient was being ignored because he sat in the room earlier today for an hour and states noone come in. He wants patient to be immediately transferred to another hospital or he will take patient home AMA. He then called back while RN was on the phone with this provider to state he was already on his way to take patient AMA.     Yamil Hancock DO  [facility]

## 2022-03-20 NOTE — DISCHARGE SUMMARY
[facility] Physician Discharge Summary       No follow-up provider specified. Activity level: as tolerated    Diet: No diet orders on file    Labs: f/u with PCP    Condition at discharge: fair    Dispo: left AMA      Patient ID:  Jagruti Sullivan  [id number]  38 y.o.  1980    Admit date: 3/16/2022    Discharge date and time:  3/19/2022 at 2100    Admission Diagnoses: Principal Problem:    Left upper lobe pneumonia  Active Problems:    Lung mass    Pneumonia of left upper lobe due to infectious organism    Heroin use disorder, severe, in sustained remission ([facility] [address]    COPD suggested by initial evaluation ([facility] [address]    Gram-positive bacteremia    Unintentional weight loss    Normal anion gap metabolic acidosis  Resolved Problems:    * No resolved hospital problems. *      Discharge Diagnoses: Principal Problem:    Left upper lobe pneumonia  Active Problems:    Lung mass    Pneumonia of left upper lobe due to infectious organism    Heroin use disorder, severe, in sustained remission (HCC)    COPD suggested by initial evaluation ([facility] [address]    Gram-positive bacteremia    Unintentional weight loss    Normal anion gap metabolic acidosis  Resolved Problems:    * No resolved hospital problems. *      Consults:  IP CONSULT TO CRITICAL CARE  IP CONSULT TO PULMONOLOGY  IP CONSULT TO PHARMACY  IP CONSULT TO INFECTIOUS DISEASES      Hospital Course: 38 y.o. female with a history of heroin abuse in long-term remission, migraine headaches, tobacco use presents with cough and chest pain. She had Covid about 3 months ago. Over the last 2 weeks, she has had pain on the left side of her chest with deep inspiration. No palliating factors. Denies any fever, chills, or sick contacts. She took an at-home Covid test this morning which was negative. One episode of diarrhea this morning, but without recurrence. BMP only remarkable for mild metabolic acidosis with bicarbonate of 18. CBC unremarkable. cm. No pneumothorax. No pleural effusion. New masslike opacity in peripheral left upper lobe related to focal pneumonia or neoplasm. CT chest recommended. CTA CHEST W CONTRAST    Result Date: 3/16/2022  EXAMINATION: CTA OF THE CHEST 3/16/2022 2:57 pm TECHNIQUE: CTA of the chest was performed after the administration of intravenous contrast.  Multiplanar reformatted images are provided for review. MIP images are provided for review. Dose modulation, iterative reconstruction, and/or weight based adjustment of the mA/kV was utilized to reduce the radiation dose to as low as reasonably achievable. COMPARISON: None. HISTORY: ORDERING SYSTEM PROVIDED HISTORY: rule out PE, left sided chest pain TECHNOLOGIST PROVIDED HISTORY: Reason for exam:->rule out PE, left sided chest pain Decision Support Exception - unselect if not a suspected or confirmed emergency medical condition->Emergency Medical Condition (MA) FINDINGS: Mass with irregular margins and adjacent ground-glass opacities located in peripheral aspect of the left upper lobe. Small foci of gas are seen within the medial margin of the lesion. This lesion measures 3.4 x 3.7 cm. No adjacent bony erosive changes. No additional mass present. No suspicious pulmonary nodule. Hyperinflation of airspaces throughout both lungs notable in upper lobes. Blebs and bulla are also present in the upper lobes. No pneumothorax. The heart is normal in size. No pericardial effusion. View of the upper abdomen shows grossly normal bilateral adrenal glands. 1. Mass located in left upper lobe related to neoplasm or pneumonia. 2. Emphysematous changes. 3. No mediastinal lymphadenopathy.          Patient Instructions:   Discharge Medication List as of 3/19/2022  9:32 PM      CONTINUE these medications which have NOT CHANGED    Details   ibuprofen (ADVIL;MOTRIN) 400 MG tablet Take 1 PO Q 6 - 8 hrs With Food/Meals for Pain/Fever., Disp-60 tablet, R-1Print brompheniramine-pseudoephedrine-DM 2-30-10 MG/5ML syrup Take 5 mLs by mouth 4 times daily as needed for Congestion or Cough, Disp-118 mL, R-1Print      ondansetron (ZOFRAN ODT) 4 MG disintegrating tablet Take 1 tablet by mouth every 8 hours as needed for Nausea or Vomiting, Disp-10 tablet, R-0Normal      busPIRone HCl (BUSPAR PO) Take 20 mg by mouth 2 times dailyHistorical Med         STOP taking these medications       indomethacin (INDOCIN) 50 MG capsule Comments:   Reason for Stopping:                 Note that less than 30 minutes was spent in preparing discharge papers, discussing discharge with patient, medication review, etc.    NOTE: This report was transcribed using voice recognition software. Every effort was made to ensure accuracy; however, inadvertent computerized transcription errors may be present.      Signed:  Electronically signed by Alessio Osborn DO on 3/20/2022 at 6:09 AM

## 2022-03-21 LAB
COMMENT: NORMAL
CULTURE, BLOOD 2: ABNORMAL
CULTURE, BLOOD 2: ABNORMAL
HAV AB SERPL IA-ACNC: NEGATIVE
HBV SURFACE AB TITR SER: REACTIVE {TITER}
HIV-1 AND HIV-2 ANTIBODIES: NORMAL
ORGANISM: ABNORMAL
ORGANISM: ABNORMAL
REPORT: NORMAL

## 2022-03-22 LAB
BLOOD CULTURE, ROUTINE: ABNORMAL
BLOOD CULTURE, ROUTINE: ABNORMAL
ORGANISM: ABNORMAL

## 2022-03-24 LAB
BLOOD CULTURE, ROUTINE: NORMAL
CULTURE, BLOOD 2: NORMAL

## 2022-11-01 NOTE — PROGRESS NOTES
[address] [place], [place] [address]ist   Progress Note    Admitting Date and Time: 3/16/2022  1:10 PM  Admit Dx: Lung mass [R91.8]  Pneumonia of left upper lobe due to infectious organism [J18.9]    Subjective/interval history:    3/17: Pt admitted yesterday afternoon with possible left upper lobe lung mass versus bacterial pneumonia in the setting of recent COVID-19 infection. Today she still has pleuritic pain. Not producing a significant amount of sputum. She is currently negative pressure isolation until tuberculosis has been ruled out. T spot in process. Pulmonology evaluated and also recommended outpatient percutaneous biopsy and PET scan if this does not improve after several days of IV antibiotics. 3/18: Patient still has some mild pain with inspiration, but they have improved. Dot Cruz for discharge home. This morning, 1 follow-up blood cultures found to have gram-positive cocci in clusters, coagulase negative staph by PCR. Started on IV vancomycin. I discussed with patient and her  that this is likely contaminant when I assessed her this morning, however this afternoon a second bottle was marked as positive for the same organism. I called the microbiology lab and confirmed that the positive results were 1 bottle positive from each set of blood cultures that were drawn from different sites. Echocardiogram was completed, with result pending. ROS: denies fever, chills, cp, sob, n/v, HA unless stated above.      sodium bicarbonate  650 mg Oral 4x Daily    vancomycin  750 mg IntraVENous Q8H    sodium chloride flush  5-40 mL IntraVENous 2 times per day    enoxaparin  40 mg SubCUTAneous Daily    ampicillin-sulbactam  3,000 mg IntraVENous Q6H    azithromycin  500 mg IntraVENous Q24H    nicotine  1 patch TransDERmal Daily    busPIRone  20 mg Oral BID    folic acid  1 mg Oral Daily    thiamine mononitrate  100 mg Oral Daily    ascorbic acid  500 mg Oral BID     perflutren lipid Received refill request for Requip. Medication was last ordered by Anahy Shaffer. Medication was last ordered on 7/25/22 with 2 refills. Patient was last seen in the office 5.31.22. Patient has a scheduled follow up 11/9/22. Medication needs to be sent to [employer]. microspheres, 1.5 mL, ONCE PRN  brompheniramine-pseudoephedrine-DM, 5 mL, 4x Daily PRN  sodium chloride flush, 5-40 mL, PRN  sodium chloride, 25 mL, PRN  ondansetron, 4 mg, Q8H PRN   Or  ondansetron, 4 mg, Q6H PRN  polyethylene glycol, 17 g, Daily PRN  acetaminophen, 650 mg, Q6H PRN   Or  acetaminophen, 650 mg, Q6H PRN  ketorolac, 15 mg, Q6H PRN         Objective:    /61   Pulse 80   Temp 97.9 °F (36.6 °C) (Oral)   Resp 16   Ht 5' 1\" (1.549 m)   Wt 112 lb (50.8 kg)   SpO2 98%   BMI 21.16 kg/m²     General Appearance: alert and oriented to person, place and time. Appears less anxious today  Skin: warm and dry  Head: normocephalic and atraumatic  Eyes: pupils equal, round, and reactive to light, extraocular eye movements intact, conjunctivae normal  Neck: neck supple and non tender without mass   Pulmonary/Chest: Nonlabored on room air. Left upper lobe crackles which are new, slightly diminished otherwise  Cardiovascular: normal rate, normal S1 and S2 and no carotid bruits  Abdomen: soft, non-tender, non-distended, normal bowel sounds, no masses or organomegaly  Extremities: no cyanosis, no clubbing and no edema  Neurologic: no cranial nerve deficit and speech normal         Recent Labs     03/16/22  1346 03/17/22  0615    136   K 3.5 3.8    106   CO2 18* 16*   BUN 7 5*   CREATININE 0.4* 0.4*   GLUCOSE 105* 85   CALCIUM 9.1 8.7       Recent Labs     03/17/22  0615   ALKPHOS 58   PROT 7.2   LABALBU 3.4*   BILITOT 0.6   AST 35*   ALT 37*       Recent Labs     03/16/22  1346 03/17/22  0615   WBC 8.6 6.5   RBC 4.20 3.77   HGB 13.6 12.2   HCT 38.7 34.7   MCV 92.1 92.0   MCH 32.4 32.4   MCHC 35.1* 35.2*   RDW 12.4 12.3    282   MPV 9.7 9.6       Radiology:   XR CHEST (2 VW)   Final Result   1. There is no interval change in the 4.4 x 4.2 cm masslike opacity within   the left upper lobe which is worrisome for malignancy.   If there is no   interval change in the size of the opacity after antibiotic therapy dedicated   follow-up CT-guided biopsy would therefore be recommended. CTA CHEST W CONTRAST   Final Result   1. Mass located in left upper lobe related to neoplasm or pneumonia. 2. Emphysematous changes. 3. No mediastinal lymphadenopathy. XR CHEST (2 VW)   Final Result   New masslike opacity in peripheral left upper lobe related to focal pneumonia   or neoplasm. CT chest recommended. Assessment and Plan:  Principal Problem:    Left upper lobe pneumonia  Active Problems:    Lung mass    Pneumonia of left upper lobe due to infectious organism    Heroin use disorder, severe, in sustained remission (HCC)    COPD suggested by initial evaluation ([facility] [address]    Gram-positive bacteremia  Resolved Problems:    * No resolved hospital problems. *        1. Left upper lobe pneumonia in the setting of prior COVID-19 infection  -Continue Unasyn and azithromycin  -Pneumo and Legionella urine antigens presumptive negative, so we will hold off on de-escalating antibiotics at this point    2. Gram-positive bacteremia  -Started on vancomycin  -Echocardiogram completed with result pending  -Infectious disease consult requested     3. Possible left upper lobe mass  -Given appearance on CT, concerning for mass versus pneumonia  -T spot test in process. She does report having a negative TB skin test recently  -If no improvement with IV antibiotics, she will need outpatient percutaneous biopsy and PET scan     4.  Heroin use disorder in sustained remission  -in recovery for 3 years  -No narcotic pain medications per patient     5. Tobacco abuse with CT findings consistent with COPD  -Counseled on cessation  -Nicotine patch  -Not requiring supplemental oxygen at this time     6. Anxiety  -Continue buspirone     Code Status: Full code  DVT prophylaxis: Enoxaparin     Disposition: Plan is for discharge home.   Initially anticipated discharge tomorrow, however with new finding of bacteremia she would likely be hospitalized for at least a few more days     NOTE: This report was transcribed using voice recognition software. Every effort was made to ensure accuracy; however, inadvertent computerized transcription errors may be present.      Electronically signed by Era Leone DO on 3/18/2022 at 3:42 PM

## 2023-02-03 DIAGNOSIS — Z51.5 HOSPICE CARE PATIENT: ICD-10-CM

## 2023-02-03 DIAGNOSIS — G89.3 NEOPLASM RELATED PAIN: Primary | ICD-10-CM

## 2023-02-03 DIAGNOSIS — F41.9 ANXIETY: ICD-10-CM

## 2023-02-03 RX ORDER — METHADONE HYDROCHLORIDE 10 MG/1
TABLET ORAL
Qty: 75 TABLET | Refills: 0 | Status: SHIPPED | OUTPATIENT
Start: 2023-02-03 | End: 2023-03-05

## 2023-02-03 RX ORDER — LORAZEPAM 0.5 MG/1
0.5 TABLET ORAL EVERY 4 HOURS PRN
Qty: 30 TABLET | Refills: 2 | Status: SHIPPED | OUTPATIENT
Start: 2023-02-03 | End: 2023-03-05

## 2023-02-08 DIAGNOSIS — G89.3 NEOPLASM RELATED PAIN: ICD-10-CM

## 2023-02-08 DIAGNOSIS — F41.9 ANXIETY: ICD-10-CM

## 2023-02-08 DIAGNOSIS — Z51.5 HOSPICE CARE PATIENT: ICD-10-CM

## 2023-02-08 RX ORDER — METHADONE HYDROCHLORIDE 10 MG/1
TABLET ORAL
Qty: 75 TABLET | Refills: 0 | Status: SHIPPED | OUTPATIENT
Start: 2023-02-08 | End: 2023-03-10

## 2023-02-08 RX ORDER — LORAZEPAM 0.5 MG/1
0.5 TABLET ORAL
Qty: 30 TABLET | Refills: 2 | Status: SHIPPED | OUTPATIENT
Start: 2023-02-08 | End: 2023-03-10

## 2023-02-13 DIAGNOSIS — Z51.5 HOSPICE CARE PATIENT: ICD-10-CM

## 2023-02-13 DIAGNOSIS — G89.3 NEOPLASM RELATED PAIN: ICD-10-CM

## 2023-02-13 RX ORDER — METHADONE HYDROCHLORIDE 5 MG/1
TABLET ORAL
Qty: 165 TABLET | Refills: 0 | Status: SHIPPED | OUTPATIENT
Start: 2023-02-13 | End: 2023-03-15

## 2023-02-27 DIAGNOSIS — Z51.5 HOSPICE CARE PATIENT: ICD-10-CM

## 2023-02-27 DIAGNOSIS — G89.3 NEOPLASM RELATED PAIN: ICD-10-CM

## 2023-02-27 RX ORDER — METHADONE HYDROCHLORIDE 10 MG/1
10 TABLET ORAL 3 TIMES DAILY
Qty: 90 TABLET | Refills: 0 | Status: SHIPPED | OUTPATIENT
Start: 2023-02-27 | End: 2023-03-29